# Patient Record
Sex: FEMALE | Race: WHITE | NOT HISPANIC OR LATINO | URBAN - METROPOLITAN AREA
[De-identification: names, ages, dates, MRNs, and addresses within clinical notes are randomized per-mention and may not be internally consistent; named-entity substitution may affect disease eponyms.]

---

## 2017-03-08 ENCOUNTER — OUTPATIENT (OUTPATIENT)
Dept: OUTPATIENT SERVICES | Facility: HOSPITAL | Age: 22
LOS: 1 days | Discharge: HOME | End: 2017-03-08

## 2017-06-27 DIAGNOSIS — A56.00 CHLAMYDIAL INFECTION OF LOWER GENITOURINARY TRACT, UNSPECIFIED: ICD-10-CM

## 2017-06-27 DIAGNOSIS — Z01.419 ENCOUNTER FOR GYNECOLOGICAL EXAMINATION (GENERAL) (ROUTINE) WITHOUT ABNORMAL FINDINGS: ICD-10-CM

## 2017-07-19 ENCOUNTER — OUTPATIENT (OUTPATIENT)
Dept: OUTPATIENT SERVICES | Facility: HOSPITAL | Age: 22
LOS: 1 days | Discharge: HOME | End: 2017-07-19

## 2017-07-19 DIAGNOSIS — M32.14 GLOMERULAR DISEASE IN SYSTEMIC LUPUS ERYTHEMATOSUS: ICD-10-CM

## 2018-08-29 ENCOUNTER — INPATIENT (INPATIENT)
Facility: HOSPITAL | Age: 23
LOS: 2 days | Discharge: HOME | End: 2018-09-01
Attending: INTERNAL MEDICINE | Admitting: INTERNAL MEDICINE
Payer: COMMERCIAL

## 2018-08-29 VITALS
WEIGHT: 110.01 LBS | SYSTOLIC BLOOD PRESSURE: 159 MMHG | OXYGEN SATURATION: 100 % | TEMPERATURE: 98 F | DIASTOLIC BLOOD PRESSURE: 112 MMHG | HEART RATE: 81 BPM | RESPIRATION RATE: 18 BRPM

## 2018-08-29 DIAGNOSIS — M32.9 SYSTEMIC LUPUS ERYTHEMATOSUS, UNSPECIFIED: ICD-10-CM

## 2018-08-29 DIAGNOSIS — N12 TUBULO-INTERSTITIAL NEPHRITIS, NOT SPECIFIED AS ACUTE OR CHRONIC: ICD-10-CM

## 2018-08-29 DIAGNOSIS — I10 ESSENTIAL (PRIMARY) HYPERTENSION: ICD-10-CM

## 2018-08-29 LAB
ALBUMIN SERPL ELPH-MCNC: 3.7 G/DL — SIGNIFICANT CHANGE UP (ref 3.5–5.2)
ALP SERPL-CCNC: 52 U/L — SIGNIFICANT CHANGE UP (ref 30–115)
ALT FLD-CCNC: 12 U/L — SIGNIFICANT CHANGE UP (ref 0–41)
ANION GAP SERPL CALC-SCNC: 14 MMOL/L — SIGNIFICANT CHANGE UP (ref 7–14)
APPEARANCE UR: ABNORMAL
AST SERPL-CCNC: 17 U/L — SIGNIFICANT CHANGE UP (ref 0–41)
BACTERIA # UR AUTO: ABNORMAL
BASOPHILS # BLD AUTO: 0.02 K/UL — SIGNIFICANT CHANGE UP (ref 0–0.2)
BASOPHILS NFR BLD AUTO: 0.1 % — SIGNIFICANT CHANGE UP (ref 0–1)
BILIRUB DIRECT SERPL-MCNC: <0.2 MG/DL — SIGNIFICANT CHANGE UP (ref 0–0.2)
BILIRUB INDIRECT FLD-MCNC: >0 MG/DL — LOW (ref 0.2–1.2)
BILIRUB SERPL-MCNC: 0.2 MG/DL — SIGNIFICANT CHANGE UP (ref 0.2–1.2)
BILIRUB UR-MCNC: NEGATIVE — SIGNIFICANT CHANGE UP
BUN SERPL-MCNC: 25 MG/DL — HIGH (ref 10–20)
CALCIUM SERPL-MCNC: 9.2 MG/DL — SIGNIFICANT CHANGE UP (ref 8.5–10.1)
CHLORIDE SERPL-SCNC: 101 MMOL/L — SIGNIFICANT CHANGE UP (ref 98–110)
CO2 SERPL-SCNC: 24 MMOL/L — SIGNIFICANT CHANGE UP (ref 17–32)
COLOR SPEC: YELLOW — SIGNIFICANT CHANGE UP
COMMENT - URINE: SIGNIFICANT CHANGE UP
CREAT SERPL-MCNC: 1 MG/DL — SIGNIFICANT CHANGE UP (ref 0.7–1.5)
DIFF PNL FLD: ABNORMAL
EOSINOPHIL # BLD AUTO: 0.05 K/UL — SIGNIFICANT CHANGE UP (ref 0–0.7)
EOSINOPHIL NFR BLD AUTO: 0.3 % — SIGNIFICANT CHANGE UP (ref 0–8)
EPI CELLS # UR: ABNORMAL /HPF
GLUCOSE SERPL-MCNC: 81 MG/DL — SIGNIFICANT CHANGE UP (ref 70–99)
GLUCOSE UR QL: NEGATIVE MG/DL — SIGNIFICANT CHANGE UP
HCT VFR BLD CALC: 36.1 % — LOW (ref 37–47)
HGB BLD-MCNC: 12.2 G/DL — SIGNIFICANT CHANGE UP (ref 12–16)
IMM GRANULOCYTES NFR BLD AUTO: 0.6 % — HIGH (ref 0.1–0.3)
KETONES UR-MCNC: NEGATIVE — SIGNIFICANT CHANGE UP
LACTATE SERPL-SCNC: 1.6 MMOL/L — SIGNIFICANT CHANGE UP (ref 0.5–2.2)
LEUKOCYTE ESTERASE UR-ACNC: ABNORMAL
LIDOCAIN IGE QN: 44 U/L — SIGNIFICANT CHANGE UP (ref 7–60)
LYMPHOCYTES # BLD AUTO: 13.7 % — LOW (ref 20.5–51.1)
LYMPHOCYTES # BLD AUTO: 2.21 K/UL — SIGNIFICANT CHANGE UP (ref 1.2–3.4)
MCHC RBC-ENTMCNC: 27.1 PG — SIGNIFICANT CHANGE UP (ref 27–31)
MCHC RBC-ENTMCNC: 33.8 G/DL — SIGNIFICANT CHANGE UP (ref 32–37)
MCV RBC AUTO: 80 FL — LOW (ref 81–99)
MONOCYTES # BLD AUTO: 0.41 K/UL — SIGNIFICANT CHANGE UP (ref 0.1–0.6)
MONOCYTES NFR BLD AUTO: 2.5 % — SIGNIFICANT CHANGE UP (ref 1.7–9.3)
NEUTROPHILS # BLD AUTO: 13.38 K/UL — HIGH (ref 1.4–6.5)
NEUTROPHILS NFR BLD AUTO: 82.8 % — HIGH (ref 42.2–75.2)
NITRITE UR-MCNC: NEGATIVE — SIGNIFICANT CHANGE UP
NRBC # BLD: 0 /100 WBCS — SIGNIFICANT CHANGE UP (ref 0–0)
PH UR: 6 — SIGNIFICANT CHANGE UP (ref 5–8)
PLATELET # BLD AUTO: 334 K/UL — SIGNIFICANT CHANGE UP (ref 130–400)
POTASSIUM SERPL-MCNC: 4.2 MMOL/L — SIGNIFICANT CHANGE UP (ref 3.5–5)
POTASSIUM SERPL-SCNC: 4.2 MMOL/L — SIGNIFICANT CHANGE UP (ref 3.5–5)
PROT SERPL-MCNC: 5.8 G/DL — LOW (ref 6–8)
PROT UR-MCNC: 100 MG/DL
RBC # BLD: 4.51 M/UL — SIGNIFICANT CHANGE UP (ref 4.2–5.4)
RBC # FLD: 12.2 % — SIGNIFICANT CHANGE UP (ref 11.5–14.5)
RBC CASTS # UR COMP ASSIST: ABNORMAL /HPF
SODIUM SERPL-SCNC: 139 MMOL/L — SIGNIFICANT CHANGE UP (ref 135–146)
SP GR SPEC: >=1.03 (ref 1.01–1.03)
UROBILINOGEN FLD QL: 0.2 MG/DL — SIGNIFICANT CHANGE UP (ref 0.2–0.2)
WBC # BLD: 16.17 K/UL — HIGH (ref 4.8–10.8)
WBC # FLD AUTO: 16.17 K/UL — HIGH (ref 4.8–10.8)
WBC UR QL: >50 /HPF

## 2018-08-29 PROCEDURE — 93970 EXTREMITY STUDY: CPT | Mod: 26

## 2018-08-29 RX ORDER — LOSARTAN POTASSIUM 100 MG/1
50 TABLET, FILM COATED ORAL DAILY
Qty: 0 | Refills: 0 | Status: DISCONTINUED | OUTPATIENT
Start: 2018-08-29 | End: 2018-09-01

## 2018-08-29 RX ORDER — SODIUM CHLORIDE 9 MG/ML
1000 INJECTION INTRAMUSCULAR; INTRAVENOUS; SUBCUTANEOUS ONCE
Qty: 0 | Refills: 0 | Status: COMPLETED | OUTPATIENT
Start: 2018-08-29 | End: 2018-08-29

## 2018-08-29 RX ORDER — MEROPENEM 1 G/30ML
500 INJECTION INTRAVENOUS EVERY 8 HOURS
Qty: 0 | Refills: 0 | Status: DISCONTINUED | OUTPATIENT
Start: 2018-08-29 | End: 2018-08-30

## 2018-08-29 RX ORDER — HYDROXYCHLOROQUINE SULFATE 200 MG
200 TABLET ORAL
Qty: 0 | Refills: 0 | Status: DISCONTINUED | OUTPATIENT
Start: 2018-08-29 | End: 2018-09-01

## 2018-08-29 RX ORDER — MORPHINE SULFATE 50 MG/1
6 CAPSULE, EXTENDED RELEASE ORAL ONCE
Qty: 0 | Refills: 0 | Status: DISCONTINUED | OUTPATIENT
Start: 2018-08-29 | End: 2018-08-29

## 2018-08-29 RX ORDER — HEPARIN SODIUM 5000 [USP'U]/ML
5000 INJECTION INTRAVENOUS; SUBCUTANEOUS EVERY 12 HOURS
Qty: 0 | Refills: 0 | Status: DISCONTINUED | OUTPATIENT
Start: 2018-08-29 | End: 2018-09-01

## 2018-08-29 RX ORDER — ACETAMINOPHEN 500 MG
650 TABLET ORAL EVERY 6 HOURS
Qty: 0 | Refills: 0 | Status: DISCONTINUED | OUTPATIENT
Start: 2018-08-29 | End: 2018-09-01

## 2018-08-29 RX ORDER — CEFTRIAXONE 500 MG/1
1 INJECTION, POWDER, FOR SOLUTION INTRAMUSCULAR; INTRAVENOUS ONCE
Qty: 0 | Refills: 0 | Status: COMPLETED | OUTPATIENT
Start: 2018-08-29 | End: 2018-08-29

## 2018-08-29 RX ORDER — TACROLIMUS 5 MG/1
1500 CAPSULE ORAL
Qty: 0 | Refills: 0 | COMMUNITY

## 2018-08-29 RX ORDER — KETOROLAC TROMETHAMINE 30 MG/ML
15 SYRINGE (ML) INJECTION EVERY 6 HOURS
Qty: 0 | Refills: 0 | Status: DISCONTINUED | OUTPATIENT
Start: 2018-08-29 | End: 2018-09-01

## 2018-08-29 RX ORDER — MYCOPHENOLATE MOFETIL 250 MG/1
1500 CAPSULE ORAL DAILY
Qty: 0 | Refills: 0 | Status: DISCONTINUED | OUTPATIENT
Start: 2018-08-29 | End: 2018-09-01

## 2018-08-29 RX ORDER — ASPIRIN/CALCIUM CARB/MAGNESIUM 324 MG
81 TABLET ORAL DAILY
Qty: 0 | Refills: 0 | Status: DISCONTINUED | OUTPATIENT
Start: 2018-08-29 | End: 2018-09-01

## 2018-08-29 RX ORDER — MEROPENEM 1 G/30ML
INJECTION INTRAVENOUS
Qty: 0 | Refills: 0 | Status: DISCONTINUED | OUTPATIENT
Start: 2018-08-29 | End: 2018-08-30

## 2018-08-29 RX ORDER — LABETALOL HCL 100 MG
400 TABLET ORAL
Qty: 0 | Refills: 0 | Status: DISCONTINUED | OUTPATIENT
Start: 2018-08-29 | End: 2018-09-01

## 2018-08-29 RX ORDER — LOSARTAN POTASSIUM 100 MG/1
0 TABLET, FILM COATED ORAL
Qty: 0 | Refills: 0 | COMMUNITY

## 2018-08-29 RX ORDER — TACROLIMUS 5 MG/1
0 CAPSULE ORAL
Qty: 0 | Refills: 0 | COMMUNITY

## 2018-08-29 RX ORDER — TACROLIMUS 5 MG/1
2 CAPSULE ORAL
Qty: 0 | Refills: 0 | Status: DISCONTINUED | OUTPATIENT
Start: 2018-08-29 | End: 2018-09-01

## 2018-08-29 RX ORDER — SODIUM CHLORIDE 9 MG/ML
1000 INJECTION INTRAMUSCULAR; INTRAVENOUS; SUBCUTANEOUS
Qty: 0 | Refills: 0 | Status: DISCONTINUED | OUTPATIENT
Start: 2018-08-29 | End: 2018-08-29

## 2018-08-29 RX ORDER — CEFTRIAXONE 500 MG/1
1 INJECTION, POWDER, FOR SOLUTION INTRAMUSCULAR; INTRAVENOUS ONCE
Qty: 0 | Refills: 0 | Status: DISCONTINUED | OUTPATIENT
Start: 2018-08-29 | End: 2018-08-29

## 2018-08-29 RX ORDER — SODIUM CHLORIDE 9 MG/ML
3 INJECTION INTRAMUSCULAR; INTRAVENOUS; SUBCUTANEOUS ONCE
Qty: 0 | Refills: 0 | Status: COMPLETED | OUTPATIENT
Start: 2018-08-29 | End: 2018-08-29

## 2018-08-29 RX ORDER — ASPIRIN/CALCIUM CARB/MAGNESIUM 324 MG
0 TABLET ORAL
Qty: 0 | Refills: 0 | COMMUNITY

## 2018-08-29 RX ORDER — SODIUM CHLORIDE 9 MG/ML
1000 INJECTION INTRAMUSCULAR; INTRAVENOUS; SUBCUTANEOUS
Qty: 0 | Refills: 0 | Status: DISCONTINUED | OUTPATIENT
Start: 2018-08-29 | End: 2018-08-31

## 2018-08-29 RX ORDER — HYDROXYCHLOROQUINE SULFATE 200 MG
0 TABLET ORAL
Qty: 0 | Refills: 0 | COMMUNITY

## 2018-08-29 RX ORDER — MEROPENEM 1 G/30ML
500 INJECTION INTRAVENOUS ONCE
Qty: 0 | Refills: 0 | Status: COMPLETED | OUTPATIENT
Start: 2018-08-29 | End: 2018-08-29

## 2018-08-29 RX ADMIN — SODIUM CHLORIDE 1000 MILLILITER(S): 9 INJECTION INTRAMUSCULAR; INTRAVENOUS; SUBCUTANEOUS at 05:06

## 2018-08-29 RX ADMIN — MORPHINE SULFATE 6 MILLIGRAM(S): 50 CAPSULE, EXTENDED RELEASE ORAL at 06:24

## 2018-08-29 RX ADMIN — Medication 15 MILLIGRAM(S): at 17:12

## 2018-08-29 RX ADMIN — Medication 200 MILLIGRAM(S): at 17:12

## 2018-08-29 RX ADMIN — SODIUM CHLORIDE 125 MILLILITER(S): 9 INJECTION INTRAMUSCULAR; INTRAVENOUS; SUBCUTANEOUS at 12:22

## 2018-08-29 RX ADMIN — MORPHINE SULFATE 6 MILLIGRAM(S): 50 CAPSULE, EXTENDED RELEASE ORAL at 06:37

## 2018-08-29 RX ADMIN — CEFTRIAXONE 100 GRAM(S): 500 INJECTION, POWDER, FOR SOLUTION INTRAMUSCULAR; INTRAVENOUS at 05:05

## 2018-08-29 RX ADMIN — SODIUM CHLORIDE 125 MILLILITER(S): 9 INJECTION INTRAMUSCULAR; INTRAVENOUS; SUBCUTANEOUS at 12:45

## 2018-08-29 RX ADMIN — TACROLIMUS 2 MILLIGRAM(S): 5 CAPSULE ORAL at 17:13

## 2018-08-29 RX ADMIN — MORPHINE SULFATE 6 MILLIGRAM(S): 50 CAPSULE, EXTENDED RELEASE ORAL at 08:04

## 2018-08-29 RX ADMIN — Medication 650 MILLIGRAM(S): at 09:43

## 2018-08-29 RX ADMIN — SODIUM CHLORIDE 75 MILLILITER(S): 9 INJECTION INTRAMUSCULAR; INTRAVENOUS; SUBCUTANEOUS at 09:44

## 2018-08-29 RX ADMIN — SODIUM CHLORIDE 3 MILLILITER(S): 9 INJECTION INTRAMUSCULAR; INTRAVENOUS; SUBCUTANEOUS at 05:05

## 2018-08-29 RX ADMIN — Medication 20 MILLIGRAM(S): at 11:49

## 2018-08-29 RX ADMIN — MYCOPHENOLATE MOFETIL 1500 MILLIGRAM(S): 250 CAPSULE ORAL at 11:48

## 2018-08-29 RX ADMIN — Medication 81 MILLIGRAM(S): at 11:48

## 2018-08-29 RX ADMIN — MEROPENEM 100 MILLIGRAM(S): 1 INJECTION INTRAVENOUS at 10:25

## 2018-08-29 RX ADMIN — LOSARTAN POTASSIUM 50 MILLIGRAM(S): 100 TABLET, FILM COATED ORAL at 11:48

## 2018-08-29 RX ADMIN — SODIUM CHLORIDE 125 MILLILITER(S): 9 INJECTION INTRAMUSCULAR; INTRAVENOUS; SUBCUTANEOUS at 06:40

## 2018-08-29 RX ADMIN — Medication 400 MILLIGRAM(S): at 17:12

## 2018-08-29 RX ADMIN — MEROPENEM 100 MILLIGRAM(S): 1 INJECTION INTRAVENOUS at 21:29

## 2018-08-29 RX ADMIN — MEROPENEM 100 MILLIGRAM(S): 1 INJECTION INTRAVENOUS at 14:06

## 2018-08-29 NOTE — H&P ADULT - PROBLEM SELECTOR PLAN 1
for now continue iv rocephin started in the ER with ID consult for now continue iv rocephin started in the ER with ID consult. Based on CAT scan some concern there may be a gall bladder component

## 2018-08-29 NOTE — ED PROVIDER NOTE - OBJECTIVE STATEMENT
23 year old female past medical history of Lupus with kidney involvement on celceft, playuinal. patient states since last night having right flank pains with nausea and states urine appears cloudy. patient denies fever/chills, no chest pain, no shortness of breath, no dysuria. 23 year old female past medical history of Lupus with kidney involvement on cellcept , tacromilus, plaqyuinal. patient states since last night having right flank pains with nausea and states urine appears cloudy. patient denies fever/chills, no chest pain, no shortness of breath, no dysuria.

## 2018-08-29 NOTE — CHART NOTE - NSCHARTNOTEFT_GEN_A_CORE
Pt was seen and examined at bedside, she feels better , still c/o right flank pain at times, spiked fever this morning. Pt received Tylenol, blood culture was sent at 11 AM, Abx changed to Meropenem ( pt is immunocompromised, she was diagnosed with Lupus at age 12), ID consulted.    I called pts rheumatology Dr Darnell Burns [phone], message left.  Case d/w rheumatology Dr Jean ( from service)  [phone], will c/w current medications for lupus check C3, C4, DS DNA and LUIS ALFREDO level , will cancel official consult.

## 2018-08-29 NOTE — H&P ADULT - HISTORY OF PRESENT ILLNESS
24yo female with history of lupus presents to the ER due to right lower quadrant pain since 0100 today.ates up to a 7/10 on pain scale. She denies fever or dysuria. Feels better after ER treatment

## 2018-08-29 NOTE — ED PROVIDER NOTE - MEDICAL DECISION MAKING DETAILS
23yF pmhx sle, on immuno suppressant sp./w  dyuria  flank pain  - fever chilsl no hematuria   no adbomainl pain  PE  AVSS, exam as noted, CTAB, RRR, abdomen soft NTND, (+) bowel sounds no cva tendneress skin no rash -  ct  for abscess infected  stone,  labs urine  ,

## 2018-08-29 NOTE — H&P ADULT - NSHPLABSRESULTS_GEN_ALL_CORE
< from: CT Abdomen and Pelvis w/ IV Cont (08.29.18 @ 06:11) >    EXAM:  CT ABDOMEN AND PELVIS IC          PROCEDURE DATE:  08/29/2018      IMPRESSION:   1. No evidence of acute intra-abdominalpathology.  2. 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           BRENDA RUCKER M.D., ATTENDING RADIOLOGIST  This document has been electronically signed. Aug 29 2018  6:09AM      < end of copied text >                          12.2   16.17 )-----------( 334      ( 29 Aug 2018 05:20 )             36.1     08-29    139  |  101  |  25<H>  ----------------------------<  81  4.2   |  24  |  1.0    Ca    9.2      29 Aug 2018 05:20    TPro  5.8<L>  /  Alb  3.7  /  TBili  0.2  /  DBili  <0.2  /  AST  17  /  ALT  12  /  AlkPhos  52  08-29          Urinalysis Basic - ( 29 Aug 2018 04:50 )    Color: Yellow / Appearance: Cloudy / SG: >=1.030 / pH: x  Gluc: x / Ketone: Negative  / Bili: Negative / Urobili: 0.2 mg/dL   Blood: x / Protein: 100 mg/dL / Nitrite: Negative   Leuk Esterase: Moderate / RBC: 2-5 /HPF / WBC >50 /HPF   Sq Epi: x / Non Sq Epi: Few /HPF / Bacteria: Moderate        Lactate Trend  08-29 @ 05:20 Lactate:1.6         CAPILLARY BLOOD GLUCOSE

## 2018-08-29 NOTE — ED PROVIDER NOTE - NS ED ROS FT
Constitutional: (-) fever  Eyes/ENT: (-) blurry vision, (-) epistaxis  Cardiovascular: (-) chest pain, (-) syncope  Respiratory: (-) cough, (-) shortness of breath  Gastrointestinal: (-) vomiting, (-) diarrhea  Musculoskeletal: (-) neck pain, (+) back pain, (-) joint pain  Integumentary: (-) rash, (-) edema  Neurological: (-) headache, (-) altered mental status

## 2018-08-29 NOTE — ED PROVIDER NOTE - PHYSICAL EXAMINATION
Physical Exam    Vital Signs: I have reviewed the initial vital signs.  Constitutional: well-nourished, appears stated age, no acute distress  Eyes: Conjunctiva pink, Sclera clear, PERRLA, EOMI.  Cardiovascular: S1 and S2, regular rate, regular rhythm, well-perfused extremities, radial pulses equal and 2+  Respiratory: unlabored respiratory effort, clear to auscultation bilaterally no wheezing, rales and rhonchi  Gastrointestinal: soft, non-tender abdomen, no pulsatile mass, normal bowl sounds  Musculoskeletal: supple neck, no lower extremity edema, no midline tenderness  Integumentary: warm, dry, no rash  Neurologic: awake, alert, cranial nerves II-XII grossly intact, extremities’ motor and sensory functions grossly intact  Psychiatric: appropriate mood, appropriate affect Physical Exam    Vital Signs: I have reviewed the initial vital signs.  Constitutional: well-nourished, appears stated age, no acute distress  Eyes: Conjunctiva pink, Sclera clear, PERRLA, EOMI.  Cardiovascular: S1 and S2, regular rate, regular rhythm, well-perfused extremities, radial pulses equal and 2+  Respiratory: unlabored respiratory effort, clear to auscultation bilaterally no wheezing, rales and rhonchi  Gastrointestinal: soft, non-tender abdomen, no pulsatile mass, normal bowl sounds + Right CVA tenderness  Musculoskeletal: supple neck, no lower extremity edema, no midline tenderness  Integumentary: warm, dry, no rash  Neurologic: awake, alert, cranial nerves II-XII grossly intact, extremities’ motor and sensory functions grossly intact  Psychiatric: appropriate mood, appropriate affect

## 2018-08-30 DIAGNOSIS — A41.9 SEPSIS, UNSPECIFIED ORGANISM: ICD-10-CM

## 2018-08-30 LAB
ALBUMIN SERPL ELPH-MCNC: 2.3 G/DL — LOW (ref 3.5–5.2)
ALP SERPL-CCNC: 48 U/L — SIGNIFICANT CHANGE UP (ref 30–115)
ALT FLD-CCNC: 8 U/L — SIGNIFICANT CHANGE UP (ref 0–41)
ANION GAP SERPL CALC-SCNC: 9 MMOL/L — SIGNIFICANT CHANGE UP (ref 7–14)
AST SERPL-CCNC: 12 U/L — SIGNIFICANT CHANGE UP (ref 0–41)
BILIRUB SERPL-MCNC: 0.2 MG/DL — SIGNIFICANT CHANGE UP (ref 0.2–1.2)
BUN SERPL-MCNC: 13 MG/DL — SIGNIFICANT CHANGE UP (ref 10–20)
C3 SERPL-MCNC: 104 MG/DL — SIGNIFICANT CHANGE UP (ref 81–157)
C4 SERPL-MCNC: 19 MG/DL — SIGNIFICANT CHANGE UP (ref 13–39)
CALCIUM SERPL-MCNC: 7.6 MG/DL — LOW (ref 8.5–10.1)
CHLORIDE SERPL-SCNC: 110 MMOL/L — SIGNIFICANT CHANGE UP (ref 98–110)
CO2 SERPL-SCNC: 20 MMOL/L — SIGNIFICANT CHANGE UP (ref 17–32)
CREAT SERPL-MCNC: 0.7 MG/DL — SIGNIFICANT CHANGE UP (ref 0.7–1.5)
DSDNA AB SER-ACNC: 99 IU/ML — HIGH
GLUCOSE SERPL-MCNC: 82 MG/DL — SIGNIFICANT CHANGE UP (ref 70–99)
HCT VFR BLD CALC: 30.1 % — LOW (ref 37–47)
HGB BLD-MCNC: 10 G/DL — LOW (ref 12–16)
MAGNESIUM SERPL-MCNC: 1.6 MG/DL — LOW (ref 1.8–2.4)
MCHC RBC-ENTMCNC: 27 PG — SIGNIFICANT CHANGE UP (ref 27–31)
MCHC RBC-ENTMCNC: 33.2 G/DL — SIGNIFICANT CHANGE UP (ref 32–37)
MCV RBC AUTO: 81.4 FL — SIGNIFICANT CHANGE UP (ref 81–99)
NRBC # BLD: 0 /100 WBCS — SIGNIFICANT CHANGE UP (ref 0–0)
PLATELET # BLD AUTO: 256 K/UL — SIGNIFICANT CHANGE UP (ref 130–400)
POTASSIUM SERPL-MCNC: 4 MMOL/L — SIGNIFICANT CHANGE UP (ref 3.5–5)
POTASSIUM SERPL-SCNC: 4 MMOL/L — SIGNIFICANT CHANGE UP (ref 3.5–5)
PROT SERPL-MCNC: 3.9 G/DL — LOW (ref 6–8)
RBC # BLD: 3.7 M/UL — LOW (ref 4.2–5.4)
RBC # FLD: 12.3 % — SIGNIFICANT CHANGE UP (ref 11.5–14.5)
SODIUM SERPL-SCNC: 139 MMOL/L — SIGNIFICANT CHANGE UP (ref 135–146)
WBC # BLD: 14.04 K/UL — HIGH (ref 4.8–10.8)
WBC # FLD AUTO: 14.04 K/UL — HIGH (ref 4.8–10.8)

## 2018-08-30 RX ORDER — CEFTRIAXONE 500 MG/1
1 INJECTION, POWDER, FOR SOLUTION INTRAMUSCULAR; INTRAVENOUS EVERY 24 HOURS
Qty: 0 | Refills: 0 | Status: DISCONTINUED | OUTPATIENT
Start: 2018-08-30 | End: 2018-09-01

## 2018-08-30 RX ADMIN — TACROLIMUS 2 MILLIGRAM(S): 5 CAPSULE ORAL at 17:09

## 2018-08-30 RX ADMIN — Medication 200 MILLIGRAM(S): at 17:09

## 2018-08-30 RX ADMIN — Medication 200 MILLIGRAM(S): at 05:49

## 2018-08-30 RX ADMIN — SODIUM CHLORIDE 125 MILLILITER(S): 9 INJECTION INTRAMUSCULAR; INTRAVENOUS; SUBCUTANEOUS at 01:10

## 2018-08-30 RX ADMIN — TACROLIMUS 2 MILLIGRAM(S): 5 CAPSULE ORAL at 05:49

## 2018-08-30 RX ADMIN — Medication 81 MILLIGRAM(S): at 11:08

## 2018-08-30 RX ADMIN — Medication 400 MILLIGRAM(S): at 17:10

## 2018-08-30 RX ADMIN — MYCOPHENOLATE MOFETIL 1500 MILLIGRAM(S): 250 CAPSULE ORAL at 11:09

## 2018-08-30 RX ADMIN — CEFTRIAXONE 100 GRAM(S): 500 INJECTION, POWDER, FOR SOLUTION INTRAMUSCULAR; INTRAVENOUS at 09:31

## 2018-08-30 RX ADMIN — Medication 400 MILLIGRAM(S): at 05:50

## 2018-08-30 RX ADMIN — MEROPENEM 100 MILLIGRAM(S): 1 INJECTION INTRAVENOUS at 05:49

## 2018-08-30 RX ADMIN — SODIUM CHLORIDE 125 MILLILITER(S): 9 INJECTION INTRAMUSCULAR; INTRAVENOUS; SUBCUTANEOUS at 21:11

## 2018-08-30 RX ADMIN — Medication 20 MILLIGRAM(S): at 05:49

## 2018-08-30 RX ADMIN — LOSARTAN POTASSIUM 50 MILLIGRAM(S): 100 TABLET, FILM COATED ORAL at 05:49

## 2018-08-31 DIAGNOSIS — M32.9 SYSTEMIC LUPUS ERYTHEMATOSUS, UNSPECIFIED: ICD-10-CM

## 2018-08-31 DIAGNOSIS — A41.51 SEPSIS DUE TO ESCHERICHIA COLI [E. COLI]: ICD-10-CM

## 2018-08-31 LAB
-  AMIKACIN: SIGNIFICANT CHANGE UP
-  AMOXICILLIN/CLAVULANIC ACID: SIGNIFICANT CHANGE UP
-  AMPICILLIN/SULBACTAM: SIGNIFICANT CHANGE UP
-  AMPICILLIN: SIGNIFICANT CHANGE UP
-  AZTREONAM: SIGNIFICANT CHANGE UP
-  CEFAZOLIN: SIGNIFICANT CHANGE UP
-  CEFEPIME: SIGNIFICANT CHANGE UP
-  CEFOXITIN: SIGNIFICANT CHANGE UP
-  CEFTRIAXONE: SIGNIFICANT CHANGE UP
-  CIPROFLOXACIN: SIGNIFICANT CHANGE UP
-  ERTAPENEM: SIGNIFICANT CHANGE UP
-  GENTAMICIN: SIGNIFICANT CHANGE UP
-  IMIPENEM: SIGNIFICANT CHANGE UP
-  LEVOFLOXACIN: SIGNIFICANT CHANGE UP
-  MEROPENEM: SIGNIFICANT CHANGE UP
-  NITROFURANTOIN: SIGNIFICANT CHANGE UP
-  PIPERACILLIN/TAZOBACTAM: SIGNIFICANT CHANGE UP
-  TIGECYCLINE: SIGNIFICANT CHANGE UP
-  TOBRAMYCIN: SIGNIFICANT CHANGE UP
-  TRIMETHOPRIM/SULFAMETHOXAZOLE: SIGNIFICANT CHANGE UP
ANA PAT FLD IF-IMP: ABNORMAL
ANA TITR SER: ABNORMAL
CULTURE RESULTS: SIGNIFICANT CHANGE UP
HCT VFR BLD CALC: 29.1 % — LOW (ref 37–47)
HGB BLD-MCNC: 9.7 G/DL — LOW (ref 12–16)
MCHC RBC-ENTMCNC: 27.2 PG — SIGNIFICANT CHANGE UP (ref 27–31)
MCHC RBC-ENTMCNC: 33.3 G/DL — SIGNIFICANT CHANGE UP (ref 32–37)
MCV RBC AUTO: 81.5 FL — SIGNIFICANT CHANGE UP (ref 81–99)
METHOD TYPE: SIGNIFICANT CHANGE UP
NRBC # BLD: 0 /100 WBCS — SIGNIFICANT CHANGE UP (ref 0–0)
ORGANISM # SPEC MICROSCOPIC CNT: SIGNIFICANT CHANGE UP
ORGANISM # SPEC MICROSCOPIC CNT: SIGNIFICANT CHANGE UP
PLATELET # BLD AUTO: 279 K/UL — SIGNIFICANT CHANGE UP (ref 130–400)
RBC # BLD: 3.57 M/UL — LOW (ref 4.2–5.4)
RBC # FLD: 12.2 % — SIGNIFICANT CHANGE UP (ref 11.5–14.5)
SPECIMEN SOURCE: SIGNIFICANT CHANGE UP
WBC # BLD: 12.65 K/UL — HIGH (ref 4.8–10.8)
WBC # FLD AUTO: 12.65 K/UL — HIGH (ref 4.8–10.8)

## 2018-08-31 RX ORDER — FUROSEMIDE 40 MG
40 TABLET ORAL ONCE
Qty: 0 | Refills: 0 | Status: COMPLETED | OUTPATIENT
Start: 2018-08-31 | End: 2018-08-31

## 2018-08-31 RX ORDER — AMLODIPINE BESYLATE 2.5 MG/1
5 TABLET ORAL ONCE
Qty: 0 | Refills: 0 | Status: COMPLETED | OUTPATIENT
Start: 2018-08-31 | End: 2018-08-31

## 2018-08-31 RX ADMIN — Medication 400 MILLIGRAM(S): at 06:12

## 2018-08-31 RX ADMIN — AMLODIPINE BESYLATE 5 MILLIGRAM(S): 2.5 TABLET ORAL at 10:25

## 2018-08-31 RX ADMIN — TACROLIMUS 2 MILLIGRAM(S): 5 CAPSULE ORAL at 06:12

## 2018-08-31 RX ADMIN — LOSARTAN POTASSIUM 50 MILLIGRAM(S): 100 TABLET, FILM COATED ORAL at 06:12

## 2018-08-31 RX ADMIN — Medication 200 MILLIGRAM(S): at 17:30

## 2018-08-31 RX ADMIN — TACROLIMUS 2 MILLIGRAM(S): 5 CAPSULE ORAL at 17:30

## 2018-08-31 RX ADMIN — MYCOPHENOLATE MOFETIL 1500 MILLIGRAM(S): 250 CAPSULE ORAL at 11:11

## 2018-08-31 RX ADMIN — CEFTRIAXONE 100 GRAM(S): 500 INJECTION, POWDER, FOR SOLUTION INTRAMUSCULAR; INTRAVENOUS at 10:25

## 2018-08-31 RX ADMIN — Medication 400 MILLIGRAM(S): at 17:30

## 2018-08-31 RX ADMIN — Medication 650 MILLIGRAM(S): at 19:45

## 2018-08-31 RX ADMIN — Medication 81 MILLIGRAM(S): at 11:10

## 2018-08-31 RX ADMIN — Medication 200 MILLIGRAM(S): at 06:12

## 2018-08-31 RX ADMIN — Medication 40 MILLIGRAM(S): at 13:27

## 2018-08-31 RX ADMIN — Medication 20 MILLIGRAM(S): at 06:12

## 2018-08-31 NOTE — PROGRESS NOTE ADULT - PROBLEM SELECTOR PLAN 1
follow cx  DC planning   PO abx  at least 10 days of abx - IV to Po
on Ceftriaxone - follow cx for d/c  at least 10 days of abx - IV to PO

## 2018-08-31 NOTE — PROGRESS NOTE ADULT - SUBJECTIVE AND OBJECTIVE BOX
ИВАН HSIEH  23y  Female      Patient is a 23y old female who presents with a chief complaint of right lower quadrant pain found to have pyelonephritis (29 Aug 2018 06:48)      INTERVAL HPI/OVERNIGHT EVENTS:  Complained of calf pain - doppler negative for dvt.  BP elevated today    REVIEW OF SYSTEMS:  CONSTITUTIONAL: No fever, weight loss, or fatigue  EYES: No eye pain, visual disturbances, or discharge  ENMT:  c/o of facial swelling  NECK: No pain or stiffness  RESPIRATORY: No cough, wheezing, chills or hemoptysis; No shortness of breath  CARDIOVASCULAR: No chest pain, palpitations, dizziness, or leg swelling  GASTROINTESTINAL: No abdominal or epigastric pain. No nausea, vomiting, or hematemesis; No diarrhea or constipation. No melena or hematochezia.  GENITOURINARY: No dysuria, frequency, hematuria, or incontinence  NEUROLOGICAL: No headaches, memory loss, loss of strength, numbness, or tremors  SKIN: No itching, burning, rashes, or lesions   LYMPH NODES: No enlarged glands  ENDOCRINE: No heat or cold intolerance; No hair loss  MUSCULOSKELETAL: right calf discomfort  PSYCHIATRIC: No depression, anxiety, mood swings, or difficulty sleeping  HEME/LYMPH: No easy bruising, or bleeding gums  ALLERY AND IMMUNOLOGIC: No hives or eczema    T(C): 36.7 (08-31-18 @ 06:09), Max: 36.7 (08-31-18 @ 06:09)  HR: 70 (08-31-18 @ 06:09) (70 - 89)  BP: 172/95 (08-31-18 @ 06:09) (133/91 - 172/95)  RR: 16 (08-30-18 @ 22:06) (16 - 16)  SpO2: --      PHYSICAL EXAM:  GENERAL: NAD, well-groomed, well-developed  HEAD:  Atraumatic, Normocephalic  EYES: EOMI, PERRLA, conjunctiva and sclera clear  ENMT: No tonsillar erythema, exudates, or enlargement; Moist mucous membranes, Good dentition, No lesions  NECK: Supple, No JVD, Normal thyroid  NERVOUS SYSTEM:  Alert & Oriented X3, Good concentration; Motor Strength 5/5 B/L upper and lower extremities; DTRs 2+ intact and symmetric  CHEST/LUNG: Clear to percussion bilaterally; No rales, rhonchi, wheezing, or rubs  HEART: Regular rate and rhythm; No murmurs, rubs, or gallops  ABDOMEN: Soft, Nontender, Nondistended; Bowel sounds present  EXTREMITIES:  2+ Peripheral Pulses, No clubbing, cyanosis, or edema  LYMPH: No lymphadenopathy noted  SKIN: No rashes or lesions    Consultant(s) Notes Reviewed:  [x ] YES  [ ] NO  Care Discussed with Consultants/Other Providers [ x] YES  [ ] NO    LAB:  08-30    139  |  110  |  13  ----------------------------<  82  4.0   |  20  |  0.7    Ca    7.6<L>      30 Aug 2018 07:31  Mg     1.6     08-30    TPro  3.9<L>  /  Alb  2.3<L>  /  TBili  0.2  /  DBili  x   /  AST  12  /  ALT  8   /  AlkPhos  48  08-30                              9.7    12.65 )-----------( 279      ( 31 Aug 2018 06:11 )             29.1     Daily     Daily   Drug Dosing Weight  Height (cm): 149.86 (29 Aug 2018 08:20)  Weight (kg): 48 (29 Aug 2018 08:20)  BMI (kg/m2): 21.4 (29 Aug 2018 08:20)  BSA (m2): 1.41 (29 Aug 2018 08:20)  CAPILLARY BLOOD GLUCOSE        I&O's Summary    30 Aug 2018 07:01  -  31 Aug 2018 07:00  --------------------------------------------------------  IN: 1500 mL / OUT: 0 mL / NET: 1500 mL        LIVER FUNCTIONS - ( 30 Aug 2018 07:31 )  Alb: 2.3 g/dL / Pro: 3.9 g/dL / ALK PHOS: 48 U/L / ALT: 8 U/L / AST: 12 U/L / GGT: x               RADIOLOGY & ADDITIONAL TESTS:    Imaging Personally Reviewed:  [ ] YES  [ ] NO    MEDICATIONS  (STANDING):  amLODIPine   Tablet 5 milliGRAM(s) Oral once  aspirin  chewable 81 milliGRAM(s) Oral daily  cefTRIAXone   IVPB 1 Gram(s) IV Intermittent every 24 hours  heparin  Injectable 5000 Unit(s) SubCutaneous every 12 hours  hydroxychloroquine 200 milliGRAM(s) Oral two times a day  labetalol 400 milliGRAM(s) Oral two times a day  losartan 50 milliGRAM(s) Oral daily  mycophenolate mofetil 1500 milliGRAM(s) Oral daily  predniSONE   Tablet 20 milliGRAM(s) Oral daily  tacrolimus 2 milliGRAM(s) Oral two times a day    MEDICATIONS  (PRN):  acetaminophen   Tablet 650 milliGRAM(s) Oral every 6 hours PRN For Temp greater than 38 C (100.4 F)  ketorolac   Injectable 15 milliGRAM(s) IV Push every 6 hours PRN Moderate Pain (4 - 6)      HEALTH ISSUES - PROBLEM Dx:  Sepsis due to Escherichia coli: Sepsis due to Escherichia coli  Sepsis, due to unspecified organism: Sepsis, due to unspecified organism  Hypertension, unspecified type: Hypertension, unspecified type  Systemic lupus erythematosus, unspecified SLE type, unspecified organ involvement status: Systemic lupus erythematosus, unspecified SLE type, unspecified organ involvement status  Pyelonephritis: Pyelonephritis

## 2018-08-31 NOTE — PROGRESS NOTE ADULT - ASSESSMENT
22yo female with history of lupus presents to the ER due to right lower quadrant pain rates it up to a 7/10 on pain scale. She denies fever or dysuria. Feels better today but complains of facial swelling related to her BP being elevated today.    Anticipate d/c in am when cultures are resulted

## 2018-08-31 NOTE — PROGRESS NOTE ADULT - SUBJECTIVE AND OBJECTIVE BOX
infectious diseases progress note:  ИВАН HSIEH is a 23yFemale patient    PYELONEPHRITIS    Sepsis, due to unspecified organism  Hypertension, unspecified type  Systemic lupus erythematosus, unspecified SLE type, unspecified organ involvement status  Pyelonephritis      ROS:  not contributory     Allergies    No Known Allergies    ANTIBIOTICS/RELEVANT:  antimicrobials  cefTRIAXone   IVPB 1 Gram(s) IV Intermittent every 24 hours  hydroxychloroquine 200 milliGRAM(s) Oral two times a day    immunologic:  mycophenolate mofetil 1500 milliGRAM(s) Oral daily  tacrolimus 2 milliGRAM(s) Oral two times a day    OTHER:  acetaminophen   Tablet 650 milliGRAM(s) Oral every 6 hours PRN  aspirin  chewable 81 milliGRAM(s) Oral daily  heparin  Injectable 5000 Unit(s) SubCutaneous every 12 hours  ketorolac   Injectable 15 milliGRAM(s) IV Push every 6 hours PRN  labetalol 400 milliGRAM(s) Oral two times a day  losartan 50 milliGRAM(s) Oral daily  predniSONE   Tablet 20 milliGRAM(s) Oral daily  sodium chloride 0.9%. 1000 milliLiter(s) IV Continuous <Continuous>      Objective:  T(F): 98.1 (08-31-18 @ 06:09), Max: 98.1 (08-31-18 @ 06:09)  HR: 70 (08-31-18 @ 06:09) (70 - 89)  BP: 172/95 (08-31-18 @ 06:09) (133/91 - 172/95)  RR: 16 (08-30-18 @ 22:06) (16 - 16)  SpO2: --    PHYSICAL EXAM:  Constitutional:Well-developed, well nourished  Eyes:LEANN, EOMI  Ear/Nose/Throat: no oral lesion, no sinus tenderness on percussion	  Neck:no JVD, no lymphadenopathy, supple  Respiratory: CTA ashley  Cardiovascular: S1S2 RRR, no murmurs  Gastrointestinal:soft, (+) BS, no HSM  Extremities:no phlebitis     LABS:                        9.7    12.65 )-----------( 279      ( 31 Aug 2018 06:11 )             29.1     08-30    139  |  110  |  13  ----------------------------<  82  4.0   |  20  |  0.7    Ca    7.6<L>      30 Aug 2018 07:31  Mg     1.6     08-30    TPro  3.9<L>  /  Alb  2.3<L>  /  TBili  0.2  /  DBili  x   /  AST  12  /  ALT  8   /  AlkPhos  48  08-30  MICROBIOLOGY:    Culture - Blood (collected 08-29-18 @ 12:11)  Source: .Blood None  Preliminary Report (08-31-18 @ 01:02):    No growth to date.    Culture - Urine (collected 08-29-18 @ 04:50)  Source: .Urine Clean Catch (Midstream)  Preliminary Report (08-30-18 @ 17:15):    >100,000 CFU/ml Escherichia coli        Culture - Blood (collected 29 Aug 2018 12:11)  Source: .Blood None  Preliminary Report (31 Aug 2018 01:02):    No growth to date.    Culture - Urine (collected 29 Aug 2018 04:50)  Source: .Urine Clean Catch (Midstream)  Preliminary Report (30 Aug 2018 17:15):    >100,000 CFU/ml Escherichia coli      Culture Results:   No growth to date. (08-29 @ 12:11)  Culture Results:   >100,000 CFU/ml Escherichia coli (08-29 @ 04:50)        RADIOLOGY & ADDITIONAL STUDIES:

## 2018-09-01 VITALS — HEART RATE: 87 BPM | DIASTOLIC BLOOD PRESSURE: 96 MMHG | SYSTOLIC BLOOD PRESSURE: 141 MMHG

## 2018-09-01 LAB
HCT VFR BLD CALC: 31.2 % — LOW (ref 37–47)
HGB BLD-MCNC: 10.6 G/DL — LOW (ref 12–16)
MCHC RBC-ENTMCNC: 26.7 PG — LOW (ref 27–31)
MCHC RBC-ENTMCNC: 34 G/DL — SIGNIFICANT CHANGE UP (ref 32–37)
MCV RBC AUTO: 78.6 FL — LOW (ref 81–99)
NRBC # BLD: 0 /100 WBCS — SIGNIFICANT CHANGE UP (ref 0–0)
PLATELET # BLD AUTO: 298 K/UL — SIGNIFICANT CHANGE UP (ref 130–400)
RBC # BLD: 3.97 M/UL — LOW (ref 4.2–5.4)
RBC # FLD: 11.7 % — SIGNIFICANT CHANGE UP (ref 11.5–14.5)
WBC # BLD: 13.1 K/UL — HIGH (ref 4.8–10.8)
WBC # FLD AUTO: 13.1 K/UL — HIGH (ref 4.8–10.8)

## 2018-09-01 RX ORDER — CEFPODOXIME PROXETIL 100 MG
1 TABLET ORAL
Qty: 14 | Refills: 0
Start: 2018-09-01 | End: 2018-09-07

## 2018-09-01 RX ADMIN — Medication 20 MILLIGRAM(S): at 05:19

## 2018-09-01 RX ADMIN — TACROLIMUS 2 MILLIGRAM(S): 5 CAPSULE ORAL at 05:19

## 2018-09-01 RX ADMIN — LOSARTAN POTASSIUM 50 MILLIGRAM(S): 100 TABLET, FILM COATED ORAL at 05:19

## 2018-09-01 RX ADMIN — Medication 200 MILLIGRAM(S): at 05:19

## 2018-09-01 RX ADMIN — Medication 400 MILLIGRAM(S): at 05:19

## 2018-09-01 NOTE — DISCHARGE NOTE ADULT - PATIENT PORTAL LINK FT
You can access the [facility] Patient Portal, offered by [facility], by registering with the following website: [email or web address]

## 2018-09-01 NOTE — DISCHARGE NOTE ADULT - CARE PROVIDER_API CALL
Katy Patrick), Rheumatology  [address] [place] [address]  Phone: [phone]  Fax: [phone]

## 2018-09-01 NOTE — DISCHARGE NOTE ADULT - SECONDARY DIAGNOSIS.
Systemic lupus erythematosus, unspecified SLE type, unspecified organ involvement status Hypertension, unspecified type Sepsis due to Escherichia coli

## 2018-09-01 NOTE — DISCHARGE NOTE ADULT - PLAN OF CARE
continue home meds follow up with Dr. Patrick within one week follow up with PMD treat infection complete course of antibiotics  please purchase over the counter probiotics and take with each meal for ten days complete course of antibiotics

## 2018-09-01 NOTE — DISCHARGE NOTE ADULT - MEDICATION SUMMARY - MEDICATIONS TO TAKE
I will START or STAY ON the medications listed below when I get home from the hospital:    predniSONE 20 mg oral tablet  -- 1 tab(s) by mouth once a day  -- Indication: For Systemic lupus erythematosus, unspecified SLE type, unspecified organ involvement status    aspirin  -- 81  by mouth once a day  -- Indication: For Systemic lupus erythematosus, unspecified SLE type, unspecified organ involvement status    losartan  -- 50  by mouth once a day  -- Indication: For Hypertension    Plaquenil  -- 200  by mouth 2 times a day  -- Indication: For Systemic lupus erythematosus, unspecified SLE type, unspecified organ involvement status    labetalol  -- 400 milligram(s) by mouth 2 times a day  -- Indication: For Hypertension    cefpodoxime 200 mg oral tablet  -- 1 tab(s) by mouth 2 times a day   -- Finish all this medication unless otherwise directed by prescriber.  Take with food or milk.    -- Indication: For Pyelonephritis    tacrolimus  -- 2  by mouth 2 times a day  -- Indication: For Systemic lupus erythematosus, unspecified SLE type, unspecified organ involvement status    CellCept 500 mg oral tablet  -- 1500  by mouth once a day  -- Indication: For Systemic lupus erythematosus, unspecified SLE type, unspecified organ involvement status

## 2018-09-01 NOTE — DISCHARGE NOTE ADULT - HOSPITAL COURSE
24yo female with history of lupus presents to the ER due to right lower quadrant pain rates it up to a 7/10 on pain scale. She denies fever or dysuria. Feels better today but complains of facial swelling related to her BP being elevated today.  S/p norvasc and lasix with improvment.  Patient found to have ecoli sensitive to Vantin.  Script sent to pharmacy       Problem/Plan - 1:  ·  Problem: Pyelonephritis.  Plan: on Ceftriaxone - d/c on PO vantin     Problem/Plan - 2:  ·  Problem: Sepsis due to Escherichia coli.  Plan: resolving  continue supportive care.      Problem/Plan - 3:  ·  Problem: Hypertension, unspecified type.  Plan: -continue labetalol and losartan  -stat dose of Norvasc and lasix given yesterday with improvement     Problem/Plan - 4:  ·  Problem: Lupus (systemic lupus erythematosus).  Plan: - continue home meds.   Follow up with rheumatology - Dr. Patrick in one week.    D/C planning took over 60 minutes

## 2018-09-01 NOTE — DISCHARGE NOTE ADULT - CARE PLAN
Principal Discharge DX:	Pyelonephritis  Goal:	treat infection  Assessment and plan of treatment:	complete course of antibiotics  please purchase over the counter probiotics and take with each meal for ten days  Secondary Diagnosis:	Sepsis due to Escherichia coli  Goal:	treat infection  Assessment and plan of treatment:	complete course of antibiotics  Secondary Diagnosis:	Systemic lupus erythematosus, unspecified SLE type, unspecified organ involvement status  Goal:	continue home meds  Assessment and plan of treatment:	follow up with Dr. Patrick within one week  Secondary Diagnosis:	Hypertension, unspecified type  Goal:	continue home meds  Assessment and plan of treatment:	follow up with [facility]

## 2018-09-04 LAB
CULTURE RESULTS: SIGNIFICANT CHANGE UP
SPECIMEN SOURCE: SIGNIFICANT CHANGE UP

## 2018-09-06 DIAGNOSIS — M32.9 SYSTEMIC LUPUS ERYTHEMATOSUS, UNSPECIFIED: ICD-10-CM

## 2018-09-06 DIAGNOSIS — N12 TUBULO-INTERSTITIAL NEPHRITIS, NOT SPECIFIED AS ACUTE OR CHRONIC: ICD-10-CM

## 2018-09-06 DIAGNOSIS — I10 ESSENTIAL (PRIMARY) HYPERTENSION: ICD-10-CM

## 2018-09-06 DIAGNOSIS — A41.51 SEPSIS DUE TO ESCHERICHIA COLI [E. COLI]: ICD-10-CM

## 2019-07-01 ENCOUNTER — OUTPATIENT (OUTPATIENT)
Dept: OUTPATIENT SERVICES | Facility: HOSPITAL | Age: 24
LOS: 1 days | End: 2019-07-01
Payer: MEDICAID

## 2019-07-01 PROCEDURE — G9001: CPT

## 2019-07-02 ENCOUNTER — INPATIENT (INPATIENT)
Facility: HOSPITAL | Age: 24
LOS: 2 days | Discharge: HOME | End: 2019-07-05
Attending: HOSPITALIST | Admitting: HOSPITALIST
Payer: MEDICAID

## 2019-07-02 VITALS
SYSTOLIC BLOOD PRESSURE: 135 MMHG | RESPIRATION RATE: 17 BRPM | HEART RATE: 107 BPM | DIASTOLIC BLOOD PRESSURE: 82 MMHG | OXYGEN SATURATION: 99 % | TEMPERATURE: 103 F

## 2019-07-02 LAB
ALBUMIN SERPL ELPH-MCNC: 3.1 G/DL — LOW (ref 3.5–5.2)
ALP SERPL-CCNC: 50 U/L — SIGNIFICANT CHANGE UP (ref 30–115)
ALT FLD-CCNC: 10 U/L — SIGNIFICANT CHANGE UP (ref 0–41)
ANION GAP SERPL CALC-SCNC: 12 MMOL/L — SIGNIFICANT CHANGE UP (ref 7–14)
APPEARANCE UR: CLEAR — SIGNIFICANT CHANGE UP
AST SERPL-CCNC: 13 U/L — SIGNIFICANT CHANGE UP (ref 0–41)
BACTERIA # UR AUTO: ABNORMAL /HPF
BASOPHILS # BLD AUTO: 0.02 K/UL — SIGNIFICANT CHANGE UP (ref 0–0.2)
BASOPHILS NFR BLD AUTO: 0.2 % — SIGNIFICANT CHANGE UP (ref 0–1)
BILIRUB SERPL-MCNC: 0.4 MG/DL — SIGNIFICANT CHANGE UP (ref 0.2–1.2)
BILIRUB UR-MCNC: NEGATIVE — SIGNIFICANT CHANGE UP
BUN SERPL-MCNC: 11 MG/DL — SIGNIFICANT CHANGE UP (ref 10–20)
CALCIUM SERPL-MCNC: 8.4 MG/DL — LOW (ref 8.5–10.1)
CHLORIDE SERPL-SCNC: 104 MMOL/L — SIGNIFICANT CHANGE UP (ref 98–110)
CO2 SERPL-SCNC: 24 MMOL/L — SIGNIFICANT CHANGE UP (ref 17–32)
COLOR SPEC: YELLOW — SIGNIFICANT CHANGE UP
CREAT SERPL-MCNC: 0.9 MG/DL — SIGNIFICANT CHANGE UP (ref 0.7–1.5)
DIFF PNL FLD: ABNORMAL
EOSINOPHIL # BLD AUTO: 0.02 K/UL — SIGNIFICANT CHANGE UP (ref 0–0.7)
EOSINOPHIL NFR BLD AUTO: 0.2 % — SIGNIFICANT CHANGE UP (ref 0–8)
EPI CELLS # UR: ABNORMAL /HPF
ERYTHROCYTE [SEDIMENTATION RATE] IN BLOOD: 50 MM/HR — HIGH (ref 0–20)
GLUCOSE SERPL-MCNC: 92 MG/DL — SIGNIFICANT CHANGE UP (ref 70–99)
GLUCOSE UR QL: NEGATIVE MG/DL — SIGNIFICANT CHANGE UP
HCT VFR BLD CALC: 34.6 % — LOW (ref 37–47)
HGB BLD-MCNC: 11.8 G/DL — LOW (ref 12–16)
IMM GRANULOCYTES NFR BLD AUTO: 0.5 % — HIGH (ref 0.1–0.3)
KETONES UR-MCNC: NEGATIVE — SIGNIFICANT CHANGE UP
LACTATE SERPL-SCNC: 1.1 MMOL/L — SIGNIFICANT CHANGE UP (ref 0.5–2.2)
LEUKOCYTE ESTERASE UR-ACNC: NEGATIVE — SIGNIFICANT CHANGE UP
LYMPHOCYTES # BLD AUTO: 1.7 K/UL — SIGNIFICANT CHANGE UP (ref 1.2–3.4)
LYMPHOCYTES # BLD AUTO: 15.1 % — LOW (ref 20.5–51.1)
MCHC RBC-ENTMCNC: 27.3 PG — SIGNIFICANT CHANGE UP (ref 27–31)
MCHC RBC-ENTMCNC: 34.1 G/DL — SIGNIFICANT CHANGE UP (ref 32–37)
MCV RBC AUTO: 80.1 FL — LOW (ref 81–99)
MONOCYTES # BLD AUTO: 1.68 K/UL — HIGH (ref 0.1–0.6)
MONOCYTES NFR BLD AUTO: 14.9 % — HIGH (ref 1.7–9.3)
NEUTROPHILS # BLD AUTO: 7.78 K/UL — HIGH (ref 1.4–6.5)
NEUTROPHILS NFR BLD AUTO: 69.1 % — SIGNIFICANT CHANGE UP (ref 42.2–75.2)
NITRITE UR-MCNC: NEGATIVE — SIGNIFICANT CHANGE UP
NRBC # BLD: 0 /100 WBCS — SIGNIFICANT CHANGE UP (ref 0–0)
PH UR: 6.5 — SIGNIFICANT CHANGE UP (ref 5–8)
PLATELET # BLD AUTO: 328 K/UL — SIGNIFICANT CHANGE UP (ref 130–400)
POTASSIUM SERPL-MCNC: 4.2 MMOL/L — SIGNIFICANT CHANGE UP (ref 3.5–5)
POTASSIUM SERPL-SCNC: 4.2 MMOL/L — SIGNIFICANT CHANGE UP (ref 3.5–5)
PROT SERPL-MCNC: 4.9 G/DL — LOW (ref 6–8)
PROT UR-MCNC: >=300 MG/DL
RBC # BLD: 4.32 M/UL — SIGNIFICANT CHANGE UP (ref 4.2–5.4)
RBC # FLD: 11.6 % — SIGNIFICANT CHANGE UP (ref 11.5–14.5)
RBC CASTS # UR COMP ASSIST: ABNORMAL /HPF
SODIUM SERPL-SCNC: 140 MMOL/L — SIGNIFICANT CHANGE UP (ref 135–146)
SP GR SPEC: 1.01 — SIGNIFICANT CHANGE UP (ref 1.01–1.03)
UROBILINOGEN FLD QL: 0.2 MG/DL — SIGNIFICANT CHANGE UP (ref 0.2–0.2)
WBC # BLD: 11.26 K/UL — HIGH (ref 4.8–10.8)
WBC # FLD AUTO: 11.26 K/UL — HIGH (ref 4.8–10.8)

## 2019-07-02 PROCEDURE — 99291 CRITICAL CARE FIRST HOUR: CPT

## 2019-07-02 PROCEDURE — 73701 CT LOWER EXTREMITY W/DYE: CPT | Mod: 26,LT

## 2019-07-02 PROCEDURE — 74177 CT ABD & PELVIS W/CONTRAST: CPT | Mod: 26

## 2019-07-02 PROCEDURE — 93971 EXTREMITY STUDY: CPT | Mod: 26,LT

## 2019-07-02 RX ORDER — PIPERACILLIN AND TAZOBACTAM 4; .5 G/20ML; G/20ML
3.38 INJECTION, POWDER, LYOPHILIZED, FOR SOLUTION INTRAVENOUS ONCE
Refills: 0 | Status: COMPLETED | OUTPATIENT
Start: 2019-07-02 | End: 2019-07-02

## 2019-07-02 RX ORDER — VANCOMYCIN HCL 1 G
1500 VIAL (EA) INTRAVENOUS ONCE
Refills: 0 | Status: DISCONTINUED | OUTPATIENT
Start: 2019-07-02 | End: 2019-07-02

## 2019-07-02 RX ORDER — VANCOMYCIN HCL 1 G
1000 VIAL (EA) INTRAVENOUS ONCE
Refills: 0 | Status: COMPLETED | OUTPATIENT
Start: 2019-07-02 | End: 2019-07-02

## 2019-07-02 RX ORDER — SODIUM CHLORIDE 9 MG/ML
2000 INJECTION, SOLUTION INTRAVENOUS ONCE
Refills: 0 | Status: COMPLETED | OUTPATIENT
Start: 2019-07-02 | End: 2019-07-02

## 2019-07-02 RX ORDER — MORPHINE SULFATE 50 MG/1
4 CAPSULE, EXTENDED RELEASE ORAL ONCE
Refills: 0 | Status: DISCONTINUED | OUTPATIENT
Start: 2019-07-02 | End: 2019-07-02

## 2019-07-02 RX ORDER — ACETAMINOPHEN 500 MG
650 TABLET ORAL ONCE
Refills: 0 | Status: COMPLETED | OUTPATIENT
Start: 2019-07-02 | End: 2019-07-02

## 2019-07-02 RX ADMIN — MORPHINE SULFATE 4 MILLIGRAM(S): 50 CAPSULE, EXTENDED RELEASE ORAL at 21:11

## 2019-07-02 RX ADMIN — Medication 650 MILLIGRAM(S): at 23:49

## 2019-07-02 RX ADMIN — PIPERACILLIN AND TAZOBACTAM 200 GRAM(S): 4; .5 INJECTION, POWDER, LYOPHILIZED, FOR SOLUTION INTRAVENOUS at 20:27

## 2019-07-02 RX ADMIN — MORPHINE SULFATE 4 MILLIGRAM(S): 50 CAPSULE, EXTENDED RELEASE ORAL at 23:48

## 2019-07-02 RX ADMIN — SODIUM CHLORIDE 2000 MILLILITER(S): 9 INJECTION, SOLUTION INTRAVENOUS at 21:11

## 2019-07-02 RX ADMIN — Medication 250 MILLIGRAM(S): at 21:11

## 2019-07-02 RX ADMIN — SODIUM CHLORIDE 2000 MILLILITER(S): 9 INJECTION, SOLUTION INTRAVENOUS at 20:28

## 2019-07-02 RX ADMIN — Medication 1000 MILLIGRAM(S): at 23:44

## 2019-07-02 RX ADMIN — MORPHINE SULFATE 4 MILLIGRAM(S): 50 CAPSULE, EXTENDED RELEASE ORAL at 20:28

## 2019-07-02 RX ADMIN — PIPERACILLIN AND TAZOBACTAM 3.38 GRAM(S): 4; .5 INJECTION, POWDER, LYOPHILIZED, FOR SOLUTION INTRAVENOUS at 21:11

## 2019-07-02 NOTE — ED PROVIDER NOTE - OBJECTIVE STATEMENT
24F h/o SLE on prednisone, plaquenil, tacrolimus p/w left thigh pain. Pain from left hip to left knee, however mostly in left thigh. Started last night with no inciting factor(s). Admits to marked difficulty ambulating and ranging LLE. Admits to fever, chills, left lower back pain. Denies CP, SOB, abd pain, n/v/d, dysuria. Denies recent surgery, immobilization, known malignancy.

## 2019-07-02 NOTE — ED PROVIDER NOTE - PHYSICAL EXAMINATION
Constitutional: Well developed, well nourished. NAD. Good general hygiene  Head: Atraumatic.  Eyes: PERRLA. EOMI without discomfort.   ENT: No nasal discharge. Mucous membranes moist.  Neck: Supple. Painless ROM.  Cardiovascular: Regular rhythm. Regular rate. Normal S1 and S2. No murmurs. 2+ pulses in all extremities.   Pulmonary: Normal respiratory rate and effort. Lungs clear to auscultation bilaterally. No wheezing, rales, or rhonchi. Bilateral, equal lung expansion.   Abdominal: Soft. Nondistended. Nontender. No rebound or guarding.   Extremities: Left thigh swelling > right thigh. TTP overlying left thigh. Marked pain w/ passive and active ROM of LLE. No tenderness w/ palpation of left hip or left knee.  Skin: No rashes or skin changes overlying left thigh.  Neuro: AAOx3. Sensation intact throughout LLE.  Psych: Normal mood. Normal affect.

## 2019-07-02 NOTE — ED PROVIDER NOTE - PROGRESS NOTE DETAILS
I spoke to surgery about the consult and they are aware and recommend ct imaging and labs. Signed out to BRADFORD Rapp. Pending labs, urine, CT, doppler. dvt study negative as per tech ortho consulted surgery also consulted.  ortho spoke to radiology about possibility of deep space abscess vs joint infection ortho had requested MRI.  I spoke to radiology, Unable to get MRI at this time. I spoke to ortho attending who is coming to the ED now from home to see the patient. pt seen by ortho attending bedside, who does not believe pt has a septic joint at this time based on his exam.  pt able to bear weight and pain is more in the thigh.  he reuqests admission to medicine and MRI from hip to the knee with and without contrast.  ortho will follow pt and will re examine pt in the morning. I informed pt of CT recommendation for MRCP.  pt reports she is aware of abnormal finding on gallbladder vs duct and need for further testing.  she was supposed to get testing as outpatient and never got it.  pt is aware.  I informed her of need for MRCP.  pt is aware.

## 2019-07-02 NOTE — ED PROVIDER NOTE - NS ED ROS FT
General: fever, chills.   Eyes:  No visual changes, eye pain or discharge.  ENMT:  No hearing changes, pain, no sore throat or runny nose, no difficulty swallowing  Cardiac:  No chest pain, SOB or edema. No chest pain with exertion.  Respiratory:  No cough or respiratory distress. No hemoptysis. No history of asthma or RAD.  GI:  No nausea, vomiting, diarrhea or abdominal pain.  :  No dysuria, frequency or burning.  MS:  Left thigh pain. Pain w/ ambulating. Pain w/ ROM.  Neuro:  No headache or weakness.  No LOC. no numbness/parasthesias in LLE.  Skin:  No skin rash.   Endocrine: No history of thyroid disease or diabetes.

## 2019-07-02 NOTE — ED PROVIDER NOTE - ATTENDING CONTRIBUTION TO CARE
23 yo f with pmh of lupus on prednisone and plaquenil  presents with significant left thigh and left lower back pain and thigh swelling since last night, worsening.  pain is to the point she cannot ambulate or bear weight.  Pt also with fever.  no chills.  no cp, no sob, no abd pain, no urinary complaints, no numbness, no weakness.  pain worse with movement.    awake, alert.  neck supple.  pt breathing comfortably.  abd soft, nontender.  + left lower back tenderness.  NO hip tendernress.  no pain with active or passive rom of the left hip.    No knee tenderness, no knee swelling.  + swelling to the thigh, no erythema, no crepitus.   + pain in the thigh with rom.  a/p:  concern for possible deep soft tissue infection.  p:  labs, ct, iv abx, pain control, reassess, surgery consult.

## 2019-07-02 NOTE — ED PROVIDER NOTE - CLINICAL SUMMARY MEDICAL DECISION MAKING FREE TEXT BOX
Pt with history of Lupus on prednisone and immunomodulators presents with one day of fever and left thigh/hip pain and difficulty bearing weight.  no abd pain, no nausea, no vomiting, no cp, no sob, no headache.  pt septic on arrival.  pt given ivf, broad spectrum abx.  surgery consulted.  pt had CT of abd/pelvis and left thigh.  CT with concern for hip effusion.  ortho consulted.  pt seen by ortho attending bedside, who does not feel pt needs to go to OR at this time.  pt given iv abx.  pt ordered for steroids as pt is on daily immunosuppressant.  Pt admitted to medicine as per ortho.  pt to get mri as per ortho.  pt endorsed to MAR who is aware of need for mri.  ortho will follow pt as inpatient.  pt was able to bear weight for ortho attending in the ED.  DVT study negative.

## 2019-07-03 PROBLEM — I10 ESSENTIAL (PRIMARY) HYPERTENSION: Chronic | Status: ACTIVE | Noted: 2018-08-29

## 2019-07-03 LAB
ALBUMIN SERPL ELPH-MCNC: 2.8 G/DL — LOW (ref 3.5–5.2)
ALP SERPL-CCNC: 49 U/L — SIGNIFICANT CHANGE UP (ref 30–115)
ALT FLD-CCNC: 9 U/L — SIGNIFICANT CHANGE UP (ref 0–41)
ANION GAP SERPL CALC-SCNC: 12 MMOL/L — SIGNIFICANT CHANGE UP (ref 7–14)
APTT BLD: 38.6 SEC — SIGNIFICANT CHANGE UP (ref 27–39.2)
AST SERPL-CCNC: 13 U/L — SIGNIFICANT CHANGE UP (ref 0–41)
BASOPHILS # BLD AUTO: 0.01 K/UL — SIGNIFICANT CHANGE UP (ref 0–0.2)
BASOPHILS NFR BLD AUTO: 0.1 % — SIGNIFICANT CHANGE UP (ref 0–1)
BILIRUB SERPL-MCNC: 0.3 MG/DL — SIGNIFICANT CHANGE UP (ref 0.2–1.2)
BLD GP AB SCN SERPL QL: SIGNIFICANT CHANGE UP
BUN SERPL-MCNC: 16 MG/DL — SIGNIFICANT CHANGE UP (ref 10–20)
CALCIUM SERPL-MCNC: 9 MG/DL — SIGNIFICANT CHANGE UP (ref 8.5–10.1)
CHLORIDE SERPL-SCNC: 105 MMOL/L — SIGNIFICANT CHANGE UP (ref 98–110)
CK SERPL-CCNC: 74 U/L — SIGNIFICANT CHANGE UP (ref 0–225)
CO2 SERPL-SCNC: 24 MMOL/L — SIGNIFICANT CHANGE UP (ref 17–32)
CREAT SERPL-MCNC: 0.9 MG/DL — SIGNIFICANT CHANGE UP (ref 0.7–1.5)
CRP SERPL-MCNC: 5.84 MG/DL — HIGH (ref 0–0.4)
D DIMER BLD IA.RAPID-MCNC: 167 NG/ML DDU — SIGNIFICANT CHANGE UP (ref 0–230)
EOSINOPHIL # BLD AUTO: 0 K/UL — SIGNIFICANT CHANGE UP (ref 0–0.7)
EOSINOPHIL NFR BLD AUTO: 0 % — SIGNIFICANT CHANGE UP (ref 0–8)
GLUCOSE SERPL-MCNC: 124 MG/DL — HIGH (ref 70–99)
HCT VFR BLD CALC: 33.8 % — LOW (ref 37–47)
HGB BLD-MCNC: 11.4 G/DL — LOW (ref 12–16)
IMM GRANULOCYTES NFR BLD AUTO: 0.6 % — HIGH (ref 0.1–0.3)
INR BLD: 1.04 RATIO — SIGNIFICANT CHANGE UP (ref 0.65–1.3)
LYMPHOCYTES # BLD AUTO: 0.38 K/UL — LOW (ref 1.2–3.4)
LYMPHOCYTES # BLD AUTO: 4 % — LOW (ref 20.5–51.1)
MCHC RBC-ENTMCNC: 26.8 PG — LOW (ref 27–31)
MCHC RBC-ENTMCNC: 33.7 G/DL — SIGNIFICANT CHANGE UP (ref 32–37)
MCV RBC AUTO: 79.3 FL — LOW (ref 81–99)
MONOCYTES # BLD AUTO: 0.16 K/UL — SIGNIFICANT CHANGE UP (ref 0.1–0.6)
MONOCYTES NFR BLD AUTO: 1.7 % — SIGNIFICANT CHANGE UP (ref 1.7–9.3)
NEUTROPHILS # BLD AUTO: 8.92 K/UL — HIGH (ref 1.4–6.5)
NEUTROPHILS NFR BLD AUTO: 93.6 % — HIGH (ref 42.2–75.2)
NRBC # BLD: 0 /100 WBCS — SIGNIFICANT CHANGE UP (ref 0–0)
PLATELET # BLD AUTO: 332 K/UL — SIGNIFICANT CHANGE UP (ref 130–400)
POTASSIUM SERPL-MCNC: 4.6 MMOL/L — SIGNIFICANT CHANGE UP (ref 3.5–5)
POTASSIUM SERPL-SCNC: 4.6 MMOL/L — SIGNIFICANT CHANGE UP (ref 3.5–5)
PROCALCITONIN SERPL-MCNC: 0.14 NG/ML — HIGH (ref 0.02–0.1)
PROT SERPL-MCNC: 4.9 G/DL — LOW (ref 6–8)
PROTHROM AB SERPL-ACNC: 11.9 SEC — SIGNIFICANT CHANGE UP (ref 9.95–12.87)
RBC # BLD: 4.26 M/UL — SIGNIFICANT CHANGE UP (ref 4.2–5.4)
RBC # FLD: 11.6 % — SIGNIFICANT CHANGE UP (ref 11.5–14.5)
SODIUM SERPL-SCNC: 141 MMOL/L — SIGNIFICANT CHANGE UP (ref 135–146)
WBC # BLD: 9.53 K/UL — SIGNIFICANT CHANGE UP (ref 4.8–10.8)
WBC # FLD AUTO: 9.53 K/UL — SIGNIFICANT CHANGE UP (ref 4.8–10.8)

## 2019-07-03 PROCEDURE — 99223 1ST HOSP IP/OBS HIGH 75: CPT | Mod: AI

## 2019-07-03 RX ORDER — ASPIRIN/CALCIUM CARB/MAGNESIUM 324 MG
81 TABLET ORAL DAILY
Refills: 0 | Status: DISCONTINUED | OUTPATIENT
Start: 2019-07-03 | End: 2019-07-05

## 2019-07-03 RX ORDER — TRAMADOL HYDROCHLORIDE 50 MG/1
50 TABLET ORAL EVERY 6 HOURS
Refills: 0 | Status: DISCONTINUED | OUTPATIENT
Start: 2019-07-03 | End: 2019-07-05

## 2019-07-03 RX ORDER — ACETAMINOPHEN 500 MG
650 TABLET ORAL EVERY 4 HOURS
Refills: 0 | Status: DISCONTINUED | OUTPATIENT
Start: 2019-07-03 | End: 2019-07-05

## 2019-07-03 RX ORDER — OXYCODONE AND ACETAMINOPHEN 5; 325 MG/1; MG/1
1 TABLET ORAL EVERY 6 HOURS
Refills: 0 | Status: DISCONTINUED | OUTPATIENT
Start: 2019-07-03 | End: 2019-07-05

## 2019-07-03 RX ORDER — DOCUSATE SODIUM 100 MG
100 CAPSULE ORAL THREE TIMES A DAY
Refills: 0 | Status: DISCONTINUED | OUTPATIENT
Start: 2019-07-03 | End: 2019-07-05

## 2019-07-03 RX ORDER — MYCOPHENOLATE MOFETIL 250 MG/1
1500 CAPSULE ORAL DAILY
Refills: 0 | Status: DISCONTINUED | OUTPATIENT
Start: 2019-07-03 | End: 2019-07-05

## 2019-07-03 RX ORDER — LABETALOL HCL 100 MG
400 TABLET ORAL
Refills: 0 | Status: DISCONTINUED | OUTPATIENT
Start: 2019-07-03 | End: 2019-07-05

## 2019-07-03 RX ORDER — ACETAMINOPHEN 500 MG
650 TABLET ORAL EVERY 6 HOURS
Refills: 0 | Status: DISCONTINUED | OUTPATIENT
Start: 2019-07-03 | End: 2019-07-05

## 2019-07-03 RX ORDER — TACROLIMUS 5 MG/1
2 CAPSULE ORAL
Qty: 0 | Refills: 0 | DISCHARGE

## 2019-07-03 RX ORDER — HYDROXYCHLOROQUINE SULFATE 200 MG
200 TABLET ORAL
Refills: 0 | Status: DISCONTINUED | OUTPATIENT
Start: 2019-07-03 | End: 2019-07-05

## 2019-07-03 RX ORDER — CHLORHEXIDINE GLUCONATE 213 G/1000ML
1 SOLUTION TOPICAL
Refills: 0 | Status: DISCONTINUED | OUTPATIENT
Start: 2019-07-03 | End: 2019-07-05

## 2019-07-03 RX ORDER — SENNA PLUS 8.6 MG/1
2 TABLET ORAL AT BEDTIME
Refills: 0 | Status: DISCONTINUED | OUTPATIENT
Start: 2019-07-03 | End: 2019-07-05

## 2019-07-03 RX ORDER — ENOXAPARIN SODIUM 100 MG/ML
40 INJECTION SUBCUTANEOUS DAILY
Refills: 0 | Status: DISCONTINUED | OUTPATIENT
Start: 2019-07-03 | End: 2019-07-05

## 2019-07-03 RX ORDER — PANTOPRAZOLE SODIUM 20 MG/1
40 TABLET, DELAYED RELEASE ORAL
Refills: 0 | Status: DISCONTINUED | OUTPATIENT
Start: 2019-07-03 | End: 2019-07-05

## 2019-07-03 RX ORDER — KETOROLAC TROMETHAMINE 30 MG/ML
15 SYRINGE (ML) INJECTION ONCE
Refills: 0 | Status: DISCONTINUED | OUTPATIENT
Start: 2019-07-03 | End: 2019-07-03

## 2019-07-03 RX ORDER — LOSARTAN POTASSIUM 100 MG/1
50 TABLET, FILM COATED ORAL DAILY
Refills: 0 | Status: DISCONTINUED | OUTPATIENT
Start: 2019-07-03 | End: 2019-07-05

## 2019-07-03 RX ADMIN — MORPHINE SULFATE 4 MILLIGRAM(S): 50 CAPSULE, EXTENDED RELEASE ORAL at 02:15

## 2019-07-03 RX ADMIN — Medication 400 MILLIGRAM(S): at 06:45

## 2019-07-03 RX ADMIN — Medication 650 MILLIGRAM(S): at 02:16

## 2019-07-03 RX ADMIN — MYCOPHENOLATE MOFETIL 1500 MILLIGRAM(S): 250 CAPSULE ORAL at 11:22

## 2019-07-03 RX ADMIN — PANTOPRAZOLE SODIUM 40 MILLIGRAM(S): 20 TABLET, DELAYED RELEASE ORAL at 08:17

## 2019-07-03 RX ADMIN — LOSARTAN POTASSIUM 50 MILLIGRAM(S): 100 TABLET, FILM COATED ORAL at 06:45

## 2019-07-03 RX ADMIN — Medication 125 MILLIGRAM(S): at 02:54

## 2019-07-03 RX ADMIN — Medication 200 MILLIGRAM(S): at 06:45

## 2019-07-03 RX ADMIN — Medication 15 MILLIGRAM(S): at 02:54

## 2019-07-03 RX ADMIN — Medication 81 MILLIGRAM(S): at 11:21

## 2019-07-03 RX ADMIN — Medication 60 MILLIGRAM(S): at 06:46

## 2019-07-03 RX ADMIN — ENOXAPARIN SODIUM 40 MILLIGRAM(S): 100 INJECTION SUBCUTANEOUS at 11:21

## 2019-07-03 RX ADMIN — Medication 200 MILLIGRAM(S): at 17:06

## 2019-07-03 NOTE — H&P ADULT - HISTORY OF PRESENT ILLNESS
25 yo female with PMHx of SLE, HTN (secondary to Lupus) presents to ED with CC of Left LE pain. 25 yo female with PMHx of SLE, HTN (secondary to Lupus) presents to ED with CC of Left LE pain. Pain started yesterday morning, as a sore feeling in the leg, but got progressively worse during the day. Pain was constant, located in the left thigh above the knee joint and little below the hip joint. Pain was constant, sharp along with come tightness in the thigh, ranging from 8-10/10 in intensity, radiating to the back and the left buttock. Pain was aggravated by increased movement and relieved partially by tylenol and vicodin (put pt to sleep but when she woke up- pain came back). Pt reports increased swelling and tenderness to touch but no weakness, numbness, tingling, erythema, or discharge. Pt denies any similar episodes in the past.  Previous hospitalization was in September 2018 for UTI/pyelonephritis.     Pt denied shortness of breath, pain anywhere else, palpitations, nausea/vomiting, dizziness, LOC. Pt does not report any trauma or fall prior to the episode.     In the ED, Pt is s/p 1 dose of Zosyn and vancomycin. Vitals were significant for fever 102.5F. CT LLE w/ IV contrast showed left hip effusion. No evidence of fracture dislocation or vascular issue. Pt was evaluated by Ortho team- Low suspicion of septic arthritis, request MRI LLE w/ IV contrast. Surgery is not planning any acute surgical intervention.

## 2019-07-03 NOTE — H&P ADULT - NSHPPHYSICALEXAM_GEN_ALL_CORE
PHYSICAL EXAM:  GENERAL: NAD, speaks in full sentences, no signs of respiratory distress  HEAD:  Atraumatic, Normocephalic  EYES: EOMI, PERRLA, conjunctiva and sclera clear  NECK: Supple, No JVD  CHEST/LUNG: Clear to auscultation bilaterally; No wheeze; No crackles; No accessory muscles used  HEART: Regular rate and rhythm; No murmurs;   ABDOMEN: Soft, Nontender, Nondistended; Bowel sounds present; No guarding  EXTREMITIES:  2+ Peripheral Pulses, No cyanosis or edema  PSYCH: AAOx3  NEUROLOGY: non-focal  SKIN: No rashes or lesions PHYSICAL EXAM:  GENERAL: NAD, speaks in full sentences, no signs of respiratory distress  HEAD:  Atraumatic, Normocephalic  EYES: EOMI, PERRLA, conjunctiva and sclera clear  NECK: Supple, No JVD  CHEST/LUNG: Clear to auscultation bilaterally; No wheeze; No crackles; No accessory muscles used  HEART: Regular rate and rhythm; No murmurs;   ABDOMEN: Soft, Nontender, Nondistended; Bowel sounds present; No guarding  EXTREMITIES:  Warmth noted lateral thigh; Swelling of L thigh globally vs. contralateral thigh; tenderness to palpation+ left thigh; No pain with passive or active ROM of L hip; Difficulties with SLR, and maintaining SLR  PSYCH: AAOx3  NEUROLOGY: non-focal  SKIN: No rashes or lesions PHYSICAL EXAM:  GENERAL: NAD, speaks in full sentences, no signs of respiratory distress  HEAD:  Atraumatic, Normocephalic  EYES: EOMI, PERRLA, conjunctiva and sclera clear  NECK: Supple, No JVD  CHEST/LUNG: Clear to auscultation bilaterally; No wheeze; No crackles; No accessory muscles used  HEART: Regular rate and rhythm; No murmurs;   ABDOMEN: Soft, Nontender, Nondistended; Bowel sounds present; No guarding  EXTREMITIES:  Warmth noted lateral thigh; Swelling of L thigh globally vs. contralateral thigh; Tenderness to palpation+ left thigh; +pain with knee flexion and extension and hip flexion/extension  PSYCH: AAOx3; Normal judgement and memory  NEUROLOGY: non-focal; Motor strength 5/5 b/l LE; Sensation intact to light touch.   SKIN: No rashes or lesions

## 2019-07-03 NOTE — H&P ADULT - ASSESSMENT
23 yo female with PMHx of SLE, HTN (secondary to Lupus) presents to ED with CC of Left LE pain.     #Left thigh pain  -likely 2/2 myositis due to SLE flare vs glucocorticoid induced (was recently increased outpt). Doubt infectious etiology (soft tissue infection vs joint space infection). No evidence of soft tissue necrotizing infection, vascular injury, soft tissue hematoma, fluid collection, displaced fracture or dislocation on CT. CT showed left hip effusion.    -Fever 102.5F on admission. Leukocytosis+ (could be steroid induced vs infectious?). No sepsis at admission.   -s/p Zosyn and vancomycin in ED. Will not start on abx for now. Consider ID eval if persistent fever and leukocytosis  -Will start on Solumedrol 60mg [id number] for now. Can switch to home dose of prednisone 50mg daily  -ESR 50; f/u CRP, CK, Aldolase, Procalcitonin levels, D-dimer levels.   -Ortho: Low level of suspicion for septic arthritis; MRI LLE w/ IV contrast (including Hip joint) requested. Pt has copper IUD in place (tried calling MRI- no answer; please call MRI before placing the order)  -Surgery: no acute surgical intervention    #SLE- c/w Plaquenil, Cellcept. On Solumedrol for now- switch to home dose of prednisone when asymptomatic  #HTN-c/w labetalol, losartan. DASH diet when able to eat  #Normocytic anemia- Likely anemia of chronic disease 2/2 SLE; Monitor H/H; no signs of active GI bleed.   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     #DVT ppx: Lovenox  #GI ppx: Protonix  #Diet: NPO for now (ortho might plan intervention after MRI)  #Activity: AAT  #FULL code  #CHG  #Dispo as per ortho; from home    ***PLEASE CONFIRM MEDICATIONS IN THE AM.

## 2019-07-03 NOTE — CONSULT NOTE ADULT - SUBJECTIVE AND OBJECTIVE BOX
ORTHO CONSULT NOTE    24F with PMH of Lupus on prednisone, plaquenil, tacrolimus presented to ED c/o left thigh pain. Pain started yesterday with difficulty ambulating. Pain located around the lateral aspect of the left thigh proximally and radiates distally to the distal femur. Denies fevers or chills however ED reported a measure of 102.5 upon arrival. CT scan reads left hip effusion. Ortho is consulted for evaluation.       PAST MEDICAL & SURGICAL HISTORY:  Hypertension  Lupus (systemic lupus erythematosus)      Allergies    No Known Allergies    Intolerances      Home Medications:  aspirin: 81  orally once a day (29 Aug 2018 06:52)  CellCept 500 mg oral tablet: 1500  orally once a day (29 Aug 2018 06:52)  labetalol: 400 milligram(s) orally 2 times a day (29 Aug 2018 06:52)  losartan: 50  orally once a day (29 Aug 2018 06:52)  Plaquenil: 200  orally 2 times a day (29 Aug 2018 06:52)  predniSONE 20 mg oral tablet: 1 tab(s) orally once a day (29 Aug 2018 06:52)  tacrolimus: 2  orally 2 times a day (29 Aug 2018 06:52)      ICU Vital Signs Last 24 Hrs  T(C): 37.3 (02 Jul 2019 23:45), Max: 39.2 (02 Jul 2019 18:41)  T(F): 99.2 (02 Jul 2019 23:45), Max: 102.5 (02 Jul 2019 18:41)  HR: 103 (02 Jul 2019 23:45) (103 - 107)  BP: 121/69 (02 Jul 2019 23:45) (121/69 - 135/82)  BP(mean): --  ABP: --  ABP(mean): --  RR: 18 (02 Jul 2019 23:45) (17 - 18)  SpO2: 98% (02 Jul 2019 23:45) (98% - 99%)                          11.8   11.26 )-----------( 328      ( 02 Jul 2019 20:05 )             34.6     07-02    140  |  104  |  11  ----------------------------<  92  4.2   |  24  |  0.9    Ca    8.4<L>      02 Jul 2019 20:05    TPro  4.9<L>  /  Alb  3.1<L>  /  TBili  0.4  /  DBili  x   /  AST  13  /  ALT  10  /  AlkPhos  50  07-02        PE:    LLE  Warmth noted lateral thigh  Swelling of L thigh globally vs. contralateral thigh  TTP lateral thigh, mostly around 10 cm distal to GT  No groin pain  NTTP GT  No pain with passive or active ROM of L hip  Difficulties with SLR, and maintaining SLR  SILT distally  Motor intact EHL/FHL/TA  WWP        Imaging:  LLE CT: inconclusive.       A/P: 24F with L Thigh pain and PMH of Lupus on Prednisone  - Monitor vital signs  - Complete inflammatory markers: CRP, PCT, ESR, WBC  - Pain control  - Recommend stat LLE MRI w and w/o contrast  - Please obtain AP Pelvis and L Femur xrays  - Ortho to follow

## 2019-07-03 NOTE — H&P ADULT - NSHPREVIEWOFSYSTEMS_GEN_ALL_CORE
REVIEW OF SYSTEMS    CONSTITUTIONAL: No weakness, fevers or chills  RESPIRATORY: No cough, wheezing, hemoptysis; No shortness of breath  CARDIOVASCULAR: No chest pain or palpitations  GASTROINTESTINAL: No abdominal or epigastric pain. No nausea, vomiting, or hematemesis; No diarrhea or constipation. No melena or hematochezia.  GENITOURINARY: No dysuria, frequency or hematuria  NEUROLOGICAL: No numbness or weakness  SKIN: No itching, rashes REVIEW OF SYSTEMS    CONSTITUTIONAL: No weakness, fevers or chills  RESPIRATORY: No cough, wheezing, hemoptysis; No shortness of breath  CARDIOVASCULAR: No chest pain or palpitations  GASTROINTESTINAL: No abdominal or epigastric pain. No nausea, vomiting, or hematemesis; No diarrhea or constipation. No melena or hematochezia.  GENITOURINARY: No dysuria, frequency or hematuria  NEUROLOGICAL: No numbness or weakness  MSK- Tenderness to touch left thigh+; Warmth and swelling+ left thigh, No groin pain.   SKIN: No itching, rashes

## 2019-07-03 NOTE — CONSULT NOTE ADULT - ATTENDING COMMENTS
Pt. seen/ examined emergently  Significant improvement on pain medication  Able to get up and walk a few steps with pain in anterior and lateral distal thigh  No pain in the knee  No pain in the groin  Able to lay flat and fulli extend hip and knee  Unable to straight leg raise due to pain in lateral thigh  Significant TTP over anterior and lateral muscles in mid-thigh, no TTP proximally, in the groin, or in the knee  CT reviewed  Questionable hip effusion  Questionable vastus lateralis edema    Discussed case w/ ED attending  Low level of suspicion for septic arthritis of the hip at this time  Likely myositis  Continue with anti-inflammatory therapy, NSAIDs depending on renal function  Obtain MRI of the L thigh w/wo contrast, to include the hip joint  Will follow and re-examine  Keep patient NPO in case there are changes in presentation/ disposition
pt examined   left hip pain   left thing pain  ct shows intraarticular fluid collection  management as per ortho  no general surgery input for now.   call as needed.

## 2019-07-03 NOTE — CONSULT NOTE ADULT - SUBJECTIVE AND OBJECTIVE BOX
Consultation Note  =====================================================    HPI: 24y Female  HPI:      PAST MEDICAL & SURGICAL HISTORY:  Hypertension  Lupus (systemic lupus erythematosus)    Home Meds: Home Medications:  aspirin: 81  orally once a day (29 Aug 2018 06:52)  CellCept 500 mg oral tablet: 1500  orally once a day (29 Aug 2018 06:)  labetalol: 400 milligram(s) orally 2 times a day (29 Aug 2018 06:)  losartan: 50  orally once a day (29 Aug 2018 06:)  Plaquenil: 200  orally 2 times a day (29 Aug 2018 06:)  predniSONE 20 mg oral tablet: 1 tab(s) orally once a day (29 Aug 2018 06:)  tacrolimus: 2  orally 2 times a day (29 Aug 2018 06:)    Allergies: Allergies    No Known Allergies    Intolerances      Soc:   Denies Tobacco/EtOH/Substance use  Tobacco: [  ] yes  EtOH: [  ] yes  Substance use: [  ] yes  Advanced Directives: Presumed Full Code     ROS:    REVIEW OF SYSTEMS    [ ] A ten-point review of systems was otherwise negative except as noted.  [ ] Due to altered mental status/intubation, subjective information were not able to be obtained from the patient. History was obtained, to the extent possible, from review of the chart and collateral sources of information.      CURRENT MEDICATIONS:   --------------------------------------------------------------------------------------  Neurologic Medications    Respiratory Medications    Cardiovascular Medications    Gastrointestinal Medications    Genitourinary Medications    Hematologic/Oncologic Medications    Antimicrobial/Immunologic Medications    Endocrine/Metabolic Medications    Topical/Other Medications    --------------------------------------------------------------------------------------    VITAL SIGNS, INS/OUTS (last 24 hours):  --------------------------------------------------------------------------------------  ICU Vital Signs Last 24 Hrs  T(C): 37.3 (2019 23:45), Max: 39.2 (2019 18:41)  T(F): 99.2 (2019 23:45), Max: 102.5 (2019 18:41)  HR: 103 (2019 23:45) (103 - 107)  BP: 121/69 (2019 23:45) (121/69 - 135/82)  BP(mean): --  ABP: --  ABP(mean): --  RR: 18 (2019 23:45) (17 - 18)  SpO2: 98% (2019 23:45) (98% - 99%)    I&O's Summary    2019 07:01  -  2019 03:30  --------------------------------------------------------  IN: 2000 mL / OUT: 0 mL / NET: 2000 mL      --------------------------------------------------------------------------------------  PHYSICAL EXAM  General: NAD, AAOx3, calm and cooperative  HEENT: NCAT, LEANN, EOMI, Trachea ML, Neck supple  Cardiac: RRR S1, S2, no Murmurs, rubs or gallops  Respiratory: CTAB, normal respiratory effort, breath sounds equal BL, no wheeze, rhonchi or crackles  Abdomen: Soft, non-distended, non-tender, +bowel sounds  Rectal: Good tone, +stool, no blood, no padma-anal masses/lesions, no fistulas, fissures, hemorrhoids  Musculoskeletal: Strength 5/5 BL UE/LE, ROM intact, compartments soft  Neuro: Sensation grossly intact and equal throughout, CN II-XII intact, no focal deficits  Vascular: Pulses 2+ throughout, extremities well perfused  Skin: Warm/dry, normal color, no jaundice  Incision/wound: healing well, dressings in place, clean, dry and intact    LABS  --------------------------------------------------------------------------------------  Labs:  CAPILLARY BLOOD GLUCOSE                              11.8   11.26 )-----------( 328      ( 2019 20:05 )             34.6       Auto Neutrophil %: 69.1 % (19 @ 20:05)  Auto Immature Granulocyte %: 0.5 % (19 @ 20:05)        140  |  104  |  11  ----------------------------<  92  4.2   |  24  |  0.9      Calcium, Total Serum: 8.4 mg/dL (19 @ 20:05)      LFTs:             4.9  | 0.4  | 13       ------------------[50      ( 2019 20:05 )  3.1  | x    | 10          Lipase:x      Amylase:x         Lactate, Blood: 1.1 mmol/L (19 @ 20:30)    Coags: x    Urinalysis Basic - ( 2019 20:30 )  Color: Yellow / Appearance: Clear / S.015 / pH: x  Gluc: x / Ketone: Negative  / Bili: Negative / Urobili: 0.2 mg/dL   Blood: x / Protein: >=300 mg/dL / Nitrite: Negative   Leuk Esterase: Negative / RBC: 6-10 /HPF / WBC x   Sq Epi: x / Non Sq Epi: Occasional /HPF / Bacteria: Few /HPF  --------------------------------------------------------------------------------------  IMAGING RESULTS  < from: CT Lower Extremity w/ IV Cont, Left (19 @ 23:41) >  FINDINGS:  Left hip joint effusion.  No acute displaced fracture or dislocation. No soft tissue hematoma or   fluid collection. Visualized musculature is unremarkable. No evidence for   vascular injury.  IMPRESSION:  Left hip joint effusion.  < end of copied text >  --------------------------------------------------------------------------------------- Consultation Note  =====================================================    HPI:   24y Female      PAST MEDICAL & SURGICAL HISTORY:  Hypertension  Lupus (systemic lupus erythematosus)    Home Meds: Home Medications:  aspirin: 81  orally once a day (29 Aug 2018 06:52)  CellCept 500 mg oral tablet: 1500  orally once a day (29 Aug 2018 06:52)  labetalol: 400 milligram(s) orally 2 times a day (29 Aug 2018 06:52)  losartan: 50  orally once a day (29 Aug 2018 06:)  Plaquenil: 200  orally 2 times a day (29 Aug 2018 06:)  predniSONE 20 mg oral tablet: 1 tab(s) orally once a day (29 Aug 2018 06:)  tacrolimus: 2  orally 2 times a day (29 Aug 2018 06:)    Allergies: Allergies  No Known Allergies  Intolerances    Soc:   Denies Tobacco/EtOH/Substance use  Advanced Directives: Presumed Full Code     ROS:    REVIEW OF SYSTEMS    [ x ] A ten-point review of systems was otherwise negative except as noted.  [ ] Due to altered mental status/intubation, subjective information were not able to be obtained from the patient. History was obtained, to the extent possible, from review of the chart and collateral sources of information.    VITAL SIGNS, INS/OUTS (last 24 hours):  --------------------------------------------------------------------------------------  ICU Vital Signs Last 24 Hrs  T(C): 37.3 (2019 23:45), Max: 39.2 (2019 18:41)  T(F): 99.2 (2019 23:45), Max: 102.5 (2019 18:41)  HR: 103 (2019 23:45) (103 - 107)  BP: 121/69 (2019 23:45) (121/69 - 135/82)  RR: 18 (2019 23:45) (17 - 18)  SpO2: 98% (2019 23:45) (98% - 99%)    I&O's Summary  2019 07:01  -  2019 03:30  --------------------------------------------------------  IN: 2000 mL / OUT: 0 mL / NET: 2000 mL  --------------------------------------------------------------------------------------  PHYSICAL EXAM  General: NAD, AAOx3, calm and cooperative  HEENT: NCAT, Neck supple  Cardiac: RRR S1, S2  Respiratory: CTAB, normal respiratory effort  Abdomen: Soft, non-distended, non-tender  Musculoskeletal: Strength 5/5 BL UE/LE, ROM intact, compartments soft, +tenderness R medial thigh, +pain with hip flexion and external rotation  Skin: Warm/dry, normal color, no rash    LABS  --------------------------------------------------------------------------------------  Labs:  CAPILLARY BLOOD GLUCOSE                              11.8   11.26 )-----------( 328      ( 2019 20:05 )             34.6       Auto Neutrophil %: 69.1 % (19 @ 20:05)  Auto Immature Granulocyte %: 0.5 % (19 @ 20:05)        140  |  104  |  11  ----------------------------<  92  4.2   |  24  |  0.9      Calcium, Total Serum: 8.4 mg/dL (19 @ 20:05)      LFTs:             4.9  | 0.4  | 13       ------------------[50      ( 2019 20:05 )  3.1  | x    | 10          Lipase:x      Amylase:x         Lactate, Blood: 1.1 mmol/L (19 @ 20:30)    Coags: x    Urinalysis Basic - ( 2019 20:30 )  Color: Yellow / Appearance: Clear / S.015 / pH: x  Gluc: x / Ketone: Negative  / Bili: Negative / Urobili: 0.2 mg/dL   Blood: x / Protein: >=300 mg/dL / Nitrite: Negative   Leuk Esterase: Negative / RBC: 6-10 /HPF / WBC x   Sq Epi: x / Non Sq Epi: Occasional /HPF / Bacteria: Few /HPF  --------------------------------------------------------------------------------------  IMAGING RESULTS  < from: CT Lower Extremity w/ IV Cont, Left (19 @ 23:41) >  FINDINGS:  Left hip joint effusion.  No acute displaced fracture or dislocation. No soft tissue hematoma or   fluid collection. Visualized musculature is unremarkable. No evidence for   vascular injury.  IMPRESSION:  Left hip joint effusion.  < end of copied text >  --------------------------------------------------------------------------------------- Consultation Note  =====================================================    HPI:   24y Female PMHx Lupus on prednisone, plaquenil, tacrolimus presented to ED c/o left thigh pain. Pain started yesterday with difficulty ambulating. Pain located around the lateral aspect of the left thigh proximally and radiates distally to the distal femur. Denies fevers or chills however ED reported a measure of 102.5 upon arrival. CT scan reads left hip effusion. Ortho is consulted for evaluation.       PAST MEDICAL & SURGICAL HISTORY:  Hypertension  Lupus (systemic lupus erythematosus)    Home Meds: Home Medications:  aspirin: 81  orally once a day (29 Aug 2018 06:52)  CellCept 500 mg oral tablet: 1500  orally once a day (29 Aug 2018 06:52)  labetalol: 400 milligram(s) orally 2 times a day (29 Aug 2018 06:52)  losartan: 50  orally once a day (29 Aug 2018 06:52)  Plaquenil: 200  orally 2 times a day (29 Aug 2018 06:52)  predniSONE 20 mg oral tablet: 1 tab(s) orally once a day (29 Aug 2018 06:52)  tacrolimus: 2  orally 2 times a day (29 Aug 2018 06:52)    Allergies: Allergies  No Known Allergies  Intolerances    Soc:   Denies Tobacco/EtOH/Substance use  Advanced Directives: Presumed Full Code     ROS:    REVIEW OF SYSTEMS    [ x ] A ten-point review of systems was otherwise negative except as noted.  [ ] Due to altered mental status/intubation, subjective information were not able to be obtained from the patient. History was obtained, to the extent possible, from review of the chart and collateral sources of information.    VITAL SIGNS, INS/OUTS (last 24 hours):  --------------------------------------------------------------------------------------  ICU Vital Signs Last 24 Hrs  T(C): 37.3 (2019 23:45), Max: 39.2 (2019 18:41)  T(F): 99.2 (2019 23:45), Max: 102.5 (2019 18:41)  HR: 103 (2019 23:45) (103 - 107)  BP: 121/69 (2019 23:45) (121/69 - 135/82)  RR: 18 (2019 23:45) (17 - 18)  SpO2: 98% (2019 23:45) (98% - 99%)    I&O's Summary  2019 07:01  -  2019 03:30  --------------------------------------------------------  IN: 2000 mL / OUT: 0 mL / NET: 2000 mL  --------------------------------------------------------------------------------------  PHYSICAL EXAM  General: NAD, AAOx3, calm and cooperative  HEENT: NCAT, Neck supple  Cardiac: RRR S1, S2  Respiratory: CTAB, normal respiratory effort  Abdomen: Soft, non-distended, non-tender  Musculoskeletal: Strength 5/5 BL UE/LE, ROM intact, compartments soft, +tenderness R medial thigh, +pain with hip flexion and external rotation  Skin: Warm/dry, normal color, no rash    LABS  --------------------------------------------------------------------------------------  Labs:  CAPILLARY BLOOD GLUCOSE                              11.8   11.26 )-----------( 328      ( 2019 20:05 )             34.6       Auto Neutrophil %: 69.1 % (19 @ 20:05)  Auto Immature Granulocyte %: 0.5 % (19 @ 20:05)        140  |  104  |  11  ----------------------------<  92  4.2   |  24  |  0.9      Calcium, Total Serum: 8.4 mg/dL (19 @ 20:05)      LFTs:             4.9  | 0.4  | 13       ------------------[50      ( 2019 20:05 )  3.1  | x    | 10          Lipase:x      Amylase:x         Lactate, Blood: 1.1 mmol/L (19 @ 20:30)    Coags: x    Urinalysis Basic - ( 2019 20:30 )  Color: Yellow / Appearance: Clear / S.015 / pH: x  Gluc: x / Ketone: Negative  / Bili: Negative / Urobili: 0.2 mg/dL   Blood: x / Protein: >=300 mg/dL / Nitrite: Negative   Leuk Esterase: Negative / RBC: 6-10 /HPF / WBC x   Sq Epi: x / Non Sq Epi: Occasional /HPF / Bacteria: Few /HPF  --------------------------------------------------------------------------------------  IMAGING RESULTS  < from: CT Lower Extremity w/ IV Cont, Left (19 @ 23:41) >  FINDINGS:  Left hip joint effusion.  No acute displaced fracture or dislocation. No soft tissue hematoma or   fluid collection. Visualized musculature is unremarkable. No evidence for   vascular injury.  IMPRESSION:  Left hip joint effusion.  < end of copied text >  --------------------------------------------------------------------------------------- Consultation Note  =====================================================    HPI:   24y Female PMHx Lupus presented to ED c/o left thigh pain, she is on prednisone, plaquenil, tacrolimus for her lupus. Per pt. her pain started yesterday w/ difficulty ambulating. Pain located around the lateral aspect of the left thigh proximally and radiates distally to the distal femur. Denies fevers , denies chills however ED reported a measure of 102.5 upon arrival. CT scan shows left hip effusion.    PAST MEDICAL & SURGICAL HISTORY:  Hypertension  Lupus (systemic lupus erythematosus)    Home Meds: Home Medications:  aspirin: 81  orally once a day (29 Aug 2018 06:52)  CellCept 500 mg oral tablet: 1500  orally once a day (29 Aug 2018 06:52)  labetalol: 400 milligram(s) orally 2 times a day (29 Aug 2018 06:52)  losartan: 50  orally once a day (29 Aug 2018 06:52)  Plaquenil: 200  orally 2 times a day (29 Aug 2018 06:52)  predniSONE 20 mg oral tablet: 1 tab(s) orally once a day (29 Aug 2018 06:52)  tacrolimus: 2  orally 2 times a day (29 Aug 2018 06:52)    Allergies: Allergies  No Known Allergies  Intolerances    Soc:   Denies Tobacco/EtOH/Substance use  Advanced Directives: Presumed Full Code     ROS:    REVIEW OF SYSTEMS    [ x ] A ten-point review of systems was otherwise negative except as noted.  [ ] Due to altered mental status/intubation, subjective information were not able to be obtained from the patient. History was obtained, to the extent possible, from review of the chart and collateral sources of information.    VITAL SIGNS, INS/OUTS (last 24 hours):  --------------------------------------------------------------------------------------  ICU Vital Signs Last 24 Hrs  T(C): 37.3 (2019 23:45), Max: 39.2 (2019 18:41)  T(F): 99.2 (2019 23:45), Max: 102.5 (2019 18:41)  HR: 103 (2019 23:45) (103 - 107)  BP: 121/69 (2019 23:45) (121/69 - 135/82)  RR: 18 (2019 23:45) (17 - 18)  SpO2: 98% (2019 23:45) (98% - 99%)    I&O's Summary  2019 07:01  -  2019 03:30  --------------------------------------------------------  IN: 2000 mL / OUT: 0 mL / NET: 2000 mL  --------------------------------------------------------------------------------------  PHYSICAL EXAM  General: NAD, AAOx3, calm and cooperative  HEENT: NCAT, Neck supple  Cardiac: RRR S1, S2  Respiratory: CTAB, normal respiratory effort  Abdomen: Soft, non-distended, non-tender  Musculoskeletal: Strength 5/5 BL UE/LE, ROM intact, compartments soft, +tenderness R medial thigh, +pain with hip flexion and external rotation  Skin: Warm/dry, normal color, no rash    LABS  --------------------------------------------------------------------------------------  Labs:  CAPILLARY BLOOD GLUCOSE                              11.8   11.26 )-----------( 328      ( 2019 20:05 )             34.6       Auto Neutrophil %: 69.1 % (19 @ 20:05)  Auto Immature Granulocyte %: 0.5 % (19 @ 20:05)        140  |  104  |  11  ----------------------------<  92  4.2   |  24  |  0.9      Calcium, Total Serum: 8.4 mg/dL (19 @ 20:05)      LFTs:             4.9  | 0.4  | 13       ------------------[50      ( 2019 20:05 )  3.1  | x    | 10          Lipase:x      Amylase:x         Lactate, Blood: 1.1 mmol/L (19 @ 20:30)    Coags: x    Urinalysis Basic - ( 2019 20:30 )  Color: Yellow / Appearance: Clear / S.015 / pH: x  Gluc: x / Ketone: Negative  / Bili: Negative / Urobili: 0.2 mg/dL   Blood: x / Protein: >=300 mg/dL / Nitrite: Negative   Leuk Esterase: Negative / RBC: 6-10 /HPF / WBC x   Sq Epi: x / Non Sq Epi: Occasional /HPF / Bacteria: Few /HPF  --------------------------------------------------------------------------------------  IMAGING RESULTS  < from: CT Lower Extremity w/ IV Cont, Left (19 @ 23:41) >  FINDINGS:  Left hip joint effusion.  No acute displaced fracture or dislocation. No soft tissue hematoma or   fluid collection. Visualized musculature is unremarkable. No evidence for   vascular injury.  IMPRESSION:  Left hip joint effusion.  < end of copied text >  ---------------------------------------------------------------------------------------

## 2019-07-03 NOTE — CONSULT NOTE ADULT - ASSESSMENT
ASSESSMENT:  24y Female w/ SLE immunosuppressed, presents with few days worsening L proximal thigh pain and pain with ambulation, fever of 102.5, mild leukocytosis, concerning for possible joint space infection versus soft tissue infection.     PLAN:   CT BL LE with no evidence of soft tissue necrotizing infection  Blood Cx  Orthopedics consultation  No acute general surgery intervention indicated  Continue with care as per ortho, recall if patient worsens clinically, continued fever and up-trending WBC with evidence of soft tissue involvement     Above plan discussed with Dr. Pritchard , patient, and ED team  --------------------------------------------------------------------------------------    07-03-19 @ 03:30

## 2019-07-03 NOTE — H&P ADULT - NSHPLABSRESULTS_GEN_ALL_CORE
CBC Full  -  ( 02 Jul 2019 20:05 )  WBC Count : 11.26 K/uL  RBC Count : 4.32 M/uL  Hemoglobin : 11.8 g/dL  Hematocrit : 34.6 %  Platelet Count - Automated : 328 K/uL  Mean Cell Volume : 80.1 fL  Mean Cell Hemoglobin : 27.3 pg  Mean Cell Hemoglobin Concentration : 34.1 g/dL  Auto Neutrophil # : 7.78 K/uL  Auto Lymphocyte # : 1.70 K/uL  Auto Monocyte # : 1.68 K/uL  Auto Eosinophil # : 0.02 K/uL  Auto Basophil # : 0.02 K/uL  Auto Neutrophil % : 69.1 %  Auto Lymphocyte % : 15.1 %  Auto Monocyte % : 14.9 %  Auto Eosinophil % : 0.2 %  Auto Basophil % : 0.2 %    07-02    140  |  104  |  11  ----------------------------<  92  4.2   |  24  |  0.9    Ca    8.4<L>      02 Jul 2019 20:05    TPro  4.9<L>  /  Alb  3.1<L>  /  TBili  0.4  /  DBili  x   /  AST  13  /  ALT  10  /  AlkPhos  50  07-02    Sedimentation Rate, Erythrocyte: 50 mm/Hr (07.02.19 @ 20:05)    < from: CT Lower Extremity w/ IV Cont, Left (07.02.19 @ 23:41) >    Left hip joint effusion.    No acute displaced fracture or dislocation. No soft tissue hematoma or   fluid collection. Visualized musculature is unremarkable. No evidence for   vascular injury.    IMPRESSION:    Left hip joint effusion.    < from: CT Abdomen and Pelvis w/ IV Cont (07.02.19 @ 23:41) >    LOWER CHEST: Unremarkable.  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   SPLEEN: Unremarkable.  PANCREAS: Unremarkable.  ADRENAL GLANDS: Unremarkable.  KIDNEYS: Unremarkable.  ABDOMINOPELVIC NODES: Unremarkable.  PELVIC ORGANS: IUD noted.  PERITONEUM/MESENTERY/BOWEL: Small free pelvic fluid; likely physiologic.   No bowel obstruction.   BONES/SOFT TISSUES: Unremarkable.  OTHER: Please see separate report CT lower extremities for evaluation of   lower extremities.    IMPRESSION:   1. No evidence of acute intra-abdominal pathology.  2. 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   Outpatient MRCP recommended.

## 2019-07-03 NOTE — PROGRESS NOTE ADULT - SUBJECTIVE AND OBJECTIVE BOX
ИВАН HSIEH 24y Female  MRN#: [id number]   CODE STATUS:________      SUBJECTIVE  Patient is a 24y old Female who presents with a chief complaint of LLE pain (2019 03:53)  Currently admitted to medicine with the primary diagnosis of Myositis  Hospital course has been complicated by _______.   Today is hospital day , and this morning she is _________ and reports ________ overnight events.     Present Today:           Dobbins Catheter ()No/ ()Yes? Indication:          Central Line ()No/ ()Yes? Indication:          IV Fluids ()No/ ()Yes? Type:  Rate:  Indication:      OBJECTIVE  PAST MEDICAL & SURGICAL HISTORY  IUD (intrauterine device) in place  Hypertension  Lupus (systemic lupus erythematosus)  No significant past surgical history    ALLERGIES:  No Known Drug Allergies  scallops (Unknown)    MEDICATIONS:  STANDING MEDICATIONS  aspirin  chewable 81 milliGRAM(s) Oral daily  chlorhexidine 4% Liquid 1 Application(s) Topical <User Schedule>  docusate sodium 100 milliGRAM(s) Oral three times a day  enoxaparin Injectable 40 milliGRAM(s) SubCutaneous daily  hydroxychloroquine 200 milliGRAM(s) Oral two times a day  labetalol 400 milliGRAM(s) Oral two times a day  losartan 50 milliGRAM(s) Oral daily  methylPREDNISolone sodium succinate Injectable 60 milliGRAM(s) IV Push every 24 hours  mycophenolate mofetil 1500 milliGRAM(s) Oral daily  pantoprazole    Tablet 40 milliGRAM(s) Oral before breakfast    PRN MEDICATIONS  acetaminophen   Tablet .. 650 milliGRAM(s) Oral every 4 hours PRN  acetaminophen   Tablet .. 650 milliGRAM(s) Oral every 6 hours PRN  oxyCODONE    5 mG/acetaminophen 325 mG 1 Tablet(s) Oral every 6 hours PRN  senna 2 Tablet(s) Oral at bedtime PRN  traMADol 50 milliGRAM(s) Oral every 6 hours PRN      VITAL SIGNS: Last 24 Hours  T(C): 36.4 (2019 06:32), Max: 39.2 (2019 18:41)  T(F): 97.6 (2019 06:32), Max: 102.5 (2019 18:41)  HR: 81 (2019 06:32) (81 - 107)  BP: 137/90 (2019 06:32) (121/69 - 137/90)  BP(mean): --  RR: 18 (2019 06:32) (17 - 18)  SpO2: 98% (2019 03:32) (98% - 99%)    LABS:                        11.8   11.26 )-----------( 328      ( 2019 20:05 )             34.6     07-    140  |  104  |  11  ----------------------------<  92  4.2   |  24  |  0.9    Ca    8.4<L>      2019 20:05    TPro  4.9<L>  /  Alb  3.1<L>  /  TBili  0.4  /  DBili  x   /  AST  13  /  ALT  10  /  AlkPhos  50  07-02      Urinalysis Basic - ( 2019 20:30 )    Color: Yellow / Appearance: Clear / S.015 / pH: x  Gluc: x / Ketone: Negative  / Bili: Negative / Urobili: 0.2 mg/dL   Blood: x / Protein: >=300 mg/dL / Nitrite: Negative   Leuk Esterase: Negative / RBC: 6-10 /HPF / WBC x   Sq Epi: x / Non Sq Epi: Occasional /HPF / Bacteria: Few /HPF        Lactate, Blood: 1.1 mmol/L (19 @ 20:30)  Sedimentation Rate, Erythrocyte: 50 mm/Hr <H> (19 @ 20:05)          RADIOLOGY:      PHYSICAL EXAM:    GENERAL: NAD, well-developed, AAOx3  HEENT:  Atraumatic, Normocephalic. EOMI, PERRLA, conjunctiva and sclera clear, No JVD  PULMONARY: Clear to auscultation bilaterally; No wheeze  CARDIOVASCULAR: Regular rate and rhythm; No murmurs, rubs, or gallops  GASTROINTESTINAL: Soft, Nontender, Nondistended; Bowel sounds present  MUSCULOSKELETAL:  2+ Peripheral Pulses, No clubbing, cyanosis, or edema  NEUROLOGY: non-focal  SKIN: No rashes or lesions      ADMISSION SUMMARY  Patient is a 24y old Female who presents with a chief complaint of LLE pain of 1 day duration   Currently admitted to medicine with the primary diagnosis of possible Myositis vs septic arthritis.  Patient was seen by orthopedics team yesterday and they recommended an MRI of left leg and hip.         ASSESSMENT & PLAN    Young female with SLE, presenting for left thigh pain of 1 day duration with subjective chills. She was admitted as a case of possible myositis, however in the setting of chills, we have to r/o septic arthritis       2.    3. IUD (intrauterine device) in place  Hypertension  Lupus (systemic lupus erythematosus)        Present today:  ( ) Congestive Heart Failure, Yes? ( )Acute / ( )Acute on Chronic / ( )Chronic  :  ( )Systolic / ( )Diastolic               Plan:  ( ) Complicated Pneumonia, Type?  ( )Parapneumonic effusion / ( )Abscess / ( ) Multilobar / ( )Other               Plan:  ( ) Morbid Obesity, Yes? BMI:               Plan:  ( ) Functional Quadriplegia               Plan:  ( ) Encephalopathy               Plan:    ( ) Discussion with patient and/or family regarding goals of care  ( ) Discussed Case and Plan with Medical Attending, Name: ИВАН HSIEH 24y Female  MRN#: [id number]     SUBJECTIVE  Patient is a 24y old Female who presents with a chief complaint of LLE pain (2019 03:53)  Currently admitted to medicine with the primary diagnosis of Myositis     OBJECTIVE  PAST MEDICAL & SURGICAL HISTORY  IUD (intrauterine device) in place  Hypertension  Lupus (systemic lupus erythematosus)  No significant past surgical history    ALLERGIES:  No Known Drug Allergies  scallops (Unknown)    MEDICATIONS:  STANDING MEDICATIONS  aspirin  chewable 81 milliGRAM(s) Oral daily  chlorhexidine 4% Liquid 1 Application(s) Topical <User Schedule>  docusate sodium 100 milliGRAM(s) Oral three times a day  enoxaparin Injectable 40 milliGRAM(s) SubCutaneous daily  hydroxychloroquine 200 milliGRAM(s) Oral two times a day  labetalol 400 milliGRAM(s) Oral two times a day  losartan 50 milliGRAM(s) Oral daily  methylPREDNISolone sodium succinate Injectable 60 milliGRAM(s) IV Push every 24 hours  mycophenolate mofetil 1500 milliGRAM(s) Oral daily  pantoprazole    Tablet 40 milliGRAM(s) Oral before breakfast    PRN MEDICATIONS  acetaminophen   Tablet .. 650 milliGRAM(s) Oral every 4 hours PRN  acetaminophen   Tablet .. 650 milliGRAM(s) Oral every 6 hours PRN  oxyCODONE    5 mG/acetaminophen 325 mG 1 Tablet(s) Oral every 6 hours PRN  senna 2 Tablet(s) Oral at bedtime PRN  traMADol 50 milliGRAM(s) Oral every 6 hours PRN      VITAL SIGNS: Last 24 Hours  T(C): 36.4 (2019 06:32), Max: 39.2 (2019 18:41)  T(F): 97.6 (2019 06:32), Max: 102.5 (2019 18:41)  HR: 81 (2019 06:32) (81 - 107)  BP: 137/90 (2019 06:32) (121/69 - 137/90)  BP(mean): --  RR: 18 (2019 06:32) (17 - 18)  SpO2: 98% (2019 03:32) (98% - 99%)    LABS:                        11.8   11.26 )-----------( 328      ( 2019 20:05 )             34.6     07-02    140  |  104  |  11  ----------------------------<  92  4.2   |  24  |  0.9    Ca    8.4<L>         TPro  4.9<L>  /  Alb  3.1<L>  /  TBili  0.4  /  DBili  x   /  AST  13  /  ALT  10  /  AlkPhos  50      Urinalysis Basic - ( 2019 20:30 )    Color: Yellow / Appearance: Clear / S.015 / pH: x  Gluc: x / Ketone: Negative  / Bili: Negative / Urobili: 0.2 mg/dL   Blood: x / Protein: >=300 mg/dL / Nitrite: Negative   Leuk Esterase: Negative / RBC: 6-10 /HPF / WBC x   Sq Epi: x / Non Sq Epi: Occasional /HPF / Bacteria: Few /HPF    Lactate, Blood: 1.1 mmol/L (19 @ 20:30)  Sedimentation Rate, Erythrocyte: 50 mm/Hr <H> (19 @ 20:05)    RADIOLOGY:      PHYSICAL EXAM:    GENERAL: NAD, well-developed, AAOx3  HEENT:  Atraumatic, Normocephalic. EOMI, PERRLA, conjunctiva and sclera clear, No JVD  PULMONARY: Clear to auscultation bilaterally; No wheeze  CARDIOVASCULAR: Regular rate and rhythm; No murmurs, rubs, or gallops  GASTROINTESTINAL: Soft, Nontender, Nondistended; Bowel sounds present  MUSCULOSKELETAL:  2+ Peripheral Pulses, No clubbing, cyanosis, or edema  NEUROLOGY: non-focal  SKIN: No rashes or lesions      ADMISSION SUMMARY  Patient is a 24y old Female who presents with a chief complaint of LLE pain of 1 day duration   Currently admitted to medicine with the primary diagnosis of possible Myositis vs septic arthritis.  Patient was seen by orthopedics team yesterday and they recommended an MRI of left leg and hip.         ASSESSMENT & PLAN    Young female with SLE, presenting for left thigh pain of 1 day duration with subjective chills. She was admitted as a case of possible myositis, however in the setting of chills, we have to r/o septic arthritis       2.    3. IUD (intrauterine device) in place  Hypertension  Lupus (systemic lupus erythematosus)        Present today:  ( ) Congestive Heart Failure, Yes? ( )Acute / ( )Acute on Chronic / ( )Chronic  :  ( )Systolic / ( )Diastolic               Plan:  ( ) Complicated Pneumonia, Type?  ( )Parapneumonic effusion / ( )Abscess / ( ) Multilobar / ( )Other               Plan:  ( ) Morbid Obesity, Yes? BMI:               Plan:  ( ) Functional Quadriplegia               Plan:  ( ) Encephalopathy               Plan:    ( ) Discussion with patient and/or family regarding goals of care  ( ) Discussed Case and Plan with Medical Attending, Name: ИВАН HSIEH 24y Female  MRN#: [id number]     SUBJECTIVE  Patient is a 24y old Female who presents with a chief complaint of LLE pain (2019 03:53)  Currently admitted to medicine with the primary diagnosis of Myositis.    Currently patient says the her pain in minimal. She is able to walk and has no complaints.     OBJECTIVE  PAST MEDICAL & SURGICAL HISTORY  IUD (intrauterine device) in place  Hypertension  Lupus (systemic lupus erythematosus)  No significant past surgical history    ALLERGIES:  No Known Drug Allergies  scallops (Unknown)    MEDICATIONS:  STANDING MEDICATIONS  aspirin  chewable 81 milliGRAM(s) Oral daily  chlorhexidine 4% Liquid 1 Application(s) Topical <User Schedule>  docusate sodium 100 milliGRAM(s) Oral three times a day  enoxaparin Injectable 40 milliGRAM(s) SubCutaneous daily  hydroxychloroquine 200 milliGRAM(s) Oral two times a day  labetalol 400 milliGRAM(s) Oral two times a day  losartan 50 milliGRAM(s) Oral daily  methylPREDNISolone sodium succinate Injectable 60 milliGRAM(s) IV Push every 24 hours  mycophenolate mofetil 1500 milliGRAM(s) Oral daily  pantoprazole    Tablet 40 milliGRAM(s) Oral before breakfast    PRN MEDICATIONS  acetaminophen   Tablet .. 650 milliGRAM(s) Oral every 4 hours PRN  acetaminophen   Tablet .. 650 milliGRAM(s) Oral every 6 hours PRN  oxyCODONE    5 mG/acetaminophen 325 mG 1 Tablet(s) Oral every 6 hours PRN  senna 2 Tablet(s) Oral at bedtime PRN  traMADol 50 milliGRAM(s) Oral every 6 hours PRN      VITAL SIGNS: Last 24 Hours  T(C): 36.4 (2019 06:32), Max: 39.2 (2019 18:41)  T(F): 97.6 (2019 06:32), Max: 102.5 (2019 18:41)  HR: 81 (2019 06:32) (81 - 107)  BP: 137/90 (2019 06:32) (121/69 - 137/90)  BP(mean): --  RR: 18 (2019 06:32) (17 - 18)  SpO2: 98% (2019 03:32) (98% - 99%)    LABS:                        11.8   11.26 )-----------( 328      ( 2019 20:05 )             34.6     07-    140  |  104  |  11  ----------------------------<  92  4.2   |  24  |  0.9    Ca    8.4<L>         TPro  4.9<L>  /  Alb  3.1<L>  /  TBili  0.4  /  DBili  x   /  AST  13  /  ALT  10  /  AlkPhos  50      Urinalysis Basic - ( 2019 20:30 )    Color: Yellow / Appearance: Clear / S.015 / pH: x  Gluc: x / Ketone: Negative  / Bili: Negative / Urobili: 0.2 mg/dL   Blood: x / Protein: >=300 mg/dL / Nitrite: Negative   Leuk Esterase: Negative / RBC: 6-10 /HPF / WBC x   Sq Epi: x / Non Sq Epi: Occasional /HPF / Bacteria: Few /HPF    Lactate, Blood: 1.1 mmol/L (19 @ 20:30)  Sedimentation Rate, Erythrocyte: 50 mm/Hr <H> (19 @ 20:05)    RADIOLOGY:  < from: CT Lower Extremity w/ IV Cont, Left (19 @ 23:41) >  IMPRESSION:    Small left hip and left knee joint effusion.    No acute osseous abnormality. No soft tissue hematoma or drainable fluid   collection or inflammation.    Apparent mild generalized enlargement of the left thigh compared to the   right, nonspecific.    < end of copied text >      PHYSICAL EXAM:    GENERAL: NAD, well-developed, AAOx3  HEENT:  Atraumatic, Normocephalic. EOMI, PERRLA, conjunctiva and sclera clear, No JVD  PULMONARY: Clear to auscultation bilaterally; No wheeze  CARDIOVASCULAR: Regular rate and rhythm; No murmurs, rubs, or gallops  GASTROINTESTINAL: Soft, Nontender, Nondistended; Bowel sounds present  MUSCULOSKELETAL:  2+ Peripheral Pulses, No clubbing, cyanosis, or edema  Extremities: Left knee is not tender to palpation, no swelling or erythema and full range of motion.    ADMISSION SUMMARY  Patient is a 24y old Female who presents with a chief complaint of LLE pain of 1 day duration   Currently admitted to medicine with the primary diagnosis of possible Myositis vs septic arthritis.  Patient was seen by orthopedics team yesterday and they recommended an MRI of left leg and hip.     ASSESSMENT & PLAN    Young female with SLE, presenting for left thigh pain of 1 day duration with subjective chills. She was admitted as a case of possible myositis, however in the setting of chills, we have to r/o septic arthritis     (1) ortho  - we will continue steroids to treat a possible myositis for a possible SLE flare  - will do MRI of left lower extremity and hip to look for possible septic arthritis. Copper IUD is compatible with MRI  - no need for antibiotics right now  - monitor for fevers and if febrile, inform ortho and consider starting antibiotics     (2) HTN  - continue losartan and labetolol for her SLE induced hypertension ИВАН HSIEH 24y Female  MRN#: [id number]     SUBJECTIVE  Patient is a 24y old Female who presents with a chief complaint of LLE pain (2019 03:53)  Currently admitted to medicine with the primary diagnosis of Myositis.    Currently patient says the her pain in minimal. She is able to walk and has no complaints.     OBJECTIVE  PAST MEDICAL & SURGICAL HISTORY  IUD (intrauterine device) in place  Hypertension  Lupus (systemic lupus erythematosus)  No significant past surgical history    ALLERGIES:  No Known Drug Allergies  scallops (Unknown)    MEDICATIONS:  STANDING MEDICATIONS  aspirin  chewable 81 milliGRAM(s) Oral daily  chlorhexidine 4% Liquid 1 Application(s) Topical <User Schedule>  docusate sodium 100 milliGRAM(s) Oral three times a day  enoxaparin Injectable 40 milliGRAM(s) SubCutaneous daily  hydroxychloroquine 200 milliGRAM(s) Oral two times a day  labetalol 400 milliGRAM(s) Oral two times a day  losartan 50 milliGRAM(s) Oral daily  methylPREDNISolone sodium succinate Injectable 60 milliGRAM(s) IV Push every 24 hours  mycophenolate mofetil 1500 milliGRAM(s) Oral daily  pantoprazole    Tablet 40 milliGRAM(s) Oral before breakfast    PRN MEDICATIONS  acetaminophen   Tablet .. 650 milliGRAM(s) Oral every 4 hours PRN  acetaminophen   Tablet .. 650 milliGRAM(s) Oral every 6 hours PRN  oxyCODONE    5 mG/acetaminophen 325 mG 1 Tablet(s) Oral every 6 hours PRN  senna 2 Tablet(s) Oral at bedtime PRN  traMADol 50 milliGRAM(s) Oral every 6 hours PRN      VITAL SIGNS: Last 24 Hours  T(C): 36.4 (2019 06:32), Max: 39.2 (2019 18:41)  T(F): 97.6 (2019 06:32), Max: 102.5 (2019 18:41)  HR: 81 (2019 06:32) (81 - 107)  BP: 137/90 (2019 06:32) (121/69 - 137/90)  BP(mean): --  RR: 18 (2019 06:32) (17 - 18)  SpO2: 98% (2019 03:32) (98% - 99%)    LABS:                        11.8   11.26 )-----------( 328      ( 2019 20:05 )             34.6     07-    140  |  104  |  11  ----------------------------<  92  4.2   |  24  |  0.9    Ca    8.4<L>         TPro  4.9<L>  /  Alb  3.1<L>  /  TBili  0.4  /  DBili  x   /  AST  13  /  ALT  10  /  AlkPhos  50      Urinalysis Basic - ( 2019 20:30 )    Color: Yellow / Appearance: Clear / S.015 / pH: x  Gluc: x / Ketone: Negative  / Bili: Negative / Urobili: 0.2 mg/dL   Blood: x / Protein: >=300 mg/dL / Nitrite: Negative   Leuk Esterase: Negative / RBC: 6-10 /HPF / WBC x   Sq Epi: x / Non Sq Epi: Occasional /HPF / Bacteria: Few /HPF    Lactate, Blood: 1.1 mmol/L (19 @ 20:30)  Sedimentation Rate, Erythrocyte: 50 mm/Hr <H> (19 @ 20:05)    RADIOLOGY:  < from: CT Lower Extremity w/ IV Cont, Left (19 @ 23:41) >  IMPRESSION:    Small left hip and left knee joint effusion.    No acute osseous abnormality. No soft tissue hematoma or drainable fluid   collection or inflammation.    Apparent mild generalized enlargement of the left thigh compared to the   right, nonspecific.    < end of copied text >      PHYSICAL EXAM:    GENERAL: NAD, well-developed, AAOx3  HEENT:  Atraumatic, Normocephalic. EOMI, PERRLA, conjunctiva and sclera clear, No JVD  PULMONARY: Clear to auscultation bilaterally; No wheeze  CARDIOVASCULAR: Regular rate and rhythm; No murmurs, rubs, or gallops  GASTROINTESTINAL: Soft, Nontender, Nondistended; Bowel sounds present  MUSCULOSKELETAL:  2+ Peripheral Pulses, No clubbing, cyanosis, or edema  Extremities: Left knee is not tender to palpation, no swelling or erythema and full range of motion.    ADMISSION SUMMARY  Patient is a 24y old Female who presents with a chief complaint of LLE pain of 1 day duration   Currently admitted to medicine with the primary diagnosis of possible Myositis vs septic arthritis.  Patient was seen by orthopedics team yesterday and they recommended an MRI of left leg and hip.     ASSESSMENT & PLAN    Young female with SLE, presenting for left thigh pain of 1 day duration with subjective chills. She was admitted as a case of possible myositis, however in the setting of chills, we have to r/o septic arthritis     (1) ortho LEFT LOWER EXTREMITY PAIN SECONDARY TO SUSPICION OF MYOSITIS VERSUS SEPTIC ARTHRITIS   - we will continue steroids to treat a possible myositis for a possible SLE flare  - will do MRI of left lower extremity and hip to look for possible septic arthritis. Copper IUD is compatible with MRI  - no need for antibiotics right now  - monitor for fevers and if febrile, inform ortho and consider starting antibiotics     (2) HTN  - continue losartan and labetolol for her SLE induced hypertension

## 2019-07-03 NOTE — H&P ADULT - NSICDXPASTMEDICALHX_GEN_ALL_CORE_FT
PAST MEDICAL HISTORY:  Hypertension     IUD (intrauterine device) in place     Lupus (systemic lupus erythematosus)

## 2019-07-03 NOTE — H&P ADULT - ATTENDING COMMENTS
patient seen and examined , agree with pgy 1 assesment and plan except as indicated above,   GEN Lying in no acute distress  HEENT Pupils equal and reactive to light and accommodationSupple Neck  PULM Clear to auscultation bilaterally  CV s1s2 regular rate and rhythm  GI + bowel sounds nontnender  EXT no cyanosis or edema left thigh nontender but described as uncomfortable   PSYCH awake alert and oriented x 3  INTEG No Lesions  NEURO YO  #Systemic lupus erythematous stable  in discussing with patient no family history of autoimmune diseases  #Suspicion for choledochocal cyst outpatient mri  #Left lower extremity pain secondary to myositis versus septic arthritis , ortho following , mri pending  Progress Note Handoff    Pending:  mri then further treatment dependant on results   Family discussion: patient is of sound mind and agrees to plan of care    Disposition: Home___

## 2019-07-04 LAB
HCT VFR BLD CALC: 31.9 % — LOW (ref 37–47)
HGB BLD-MCNC: 10.8 G/DL — LOW (ref 12–16)
MCHC RBC-ENTMCNC: 27 PG — SIGNIFICANT CHANGE UP (ref 27–31)
MCHC RBC-ENTMCNC: 33.9 G/DL — SIGNIFICANT CHANGE UP (ref 32–37)
MCV RBC AUTO: 79.8 FL — LOW (ref 81–99)
NRBC # BLD: 0 /100 WBCS — SIGNIFICANT CHANGE UP (ref 0–0)
PLATELET # BLD AUTO: 356 K/UL — SIGNIFICANT CHANGE UP (ref 130–400)
RBC # BLD: 4 M/UL — LOW (ref 4.2–5.4)
RBC # FLD: 11.7 % — SIGNIFICANT CHANGE UP (ref 11.5–14.5)
WBC # BLD: 12.48 K/UL — HIGH (ref 4.8–10.8)
WBC # FLD AUTO: 12.48 K/UL — HIGH (ref 4.8–10.8)

## 2019-07-04 PROCEDURE — 99232 SBSQ HOSP IP/OBS MODERATE 35: CPT

## 2019-07-04 RX ADMIN — Medication 81 MILLIGRAM(S): at 11:21

## 2019-07-04 RX ADMIN — Medication 60 MILLIGRAM(S): at 06:23

## 2019-07-04 RX ADMIN — ENOXAPARIN SODIUM 40 MILLIGRAM(S): 100 INJECTION SUBCUTANEOUS at 11:21

## 2019-07-04 RX ADMIN — Medication 200 MILLIGRAM(S): at 05:36

## 2019-07-04 RX ADMIN — Medication 400 MILLIGRAM(S): at 17:20

## 2019-07-04 RX ADMIN — Medication 200 MILLIGRAM(S): at 17:20

## 2019-07-04 RX ADMIN — PANTOPRAZOLE SODIUM 40 MILLIGRAM(S): 20 TABLET, DELAYED RELEASE ORAL at 07:33

## 2019-07-04 RX ADMIN — Medication 400 MILLIGRAM(S): at 05:36

## 2019-07-04 RX ADMIN — LOSARTAN POTASSIUM 50 MILLIGRAM(S): 100 TABLET, FILM COATED ORAL at 05:36

## 2019-07-04 RX ADMIN — MYCOPHENOLATE MOFETIL 1500 MILLIGRAM(S): 250 CAPSULE ORAL at 11:21

## 2019-07-04 NOTE — PROGRESS NOTE ADULT - SUBJECTIVE AND OBJECTIVE BOX
ИВАН HSIEH  24y  [facility] [id number] [place] [id number] [address]      Patient is a 24y old  Female who presents with a chief complaint of LLE pain (03 Jul 2019 10:07)      INTERVAL HPI/OVERNIGHT EVENTS:    no acute events overnight     REVIEW OF SYSTEMS:  CONSTITUTIONAL: No fever, weight loss, or fatigue  EYES: No eye pain, visual disturbances, or discharge  ENMT:  No difficulty hearing, tinnitus, vertigo; No sinus or throat pain  NECK: No pain or stiffness  BREASTS: No pain, masses, or nipple discharge  RESPIRATORY: No cough, wheezing, chills or hemoptysis; No shortness of breath  CARDIOVASCULAR: No chest pain, palpitations, dizziness, or leg swelling  GASTROINTESTINAL: No abdominal or epigastric pain. No nausea, vomiting, or hematemesis; No diarrhea or constipation. No melena or hematochezia.  GENITOURINARY: No dysuria, frequency, hematuria, or incontinence  NEUROLOGICAL: No headaches, memory loss, loss of strength, numbness, or tremors  SKIN: No itching, burning, rashes, or lesions   LYMPH NODES: No enlarged glands  ENDOCRINE: No heat or cold intolerance; No hair loss  MUSCULOSKELETAL: soreness noted in left thigh  PSYCHIATRIC: No depression, anxiety, mood swings, or difficulty sleeping  HEME/LYMPH: No easy bruising, or bleeding gums  ALLERY AND IMMUNOLOGIC: No hives or eczema  FAMILY HISTORY:  FH: HTN (hypertension): father    T(C): 36.2 (07-04-19 @ 12:00), Max: 36.9 (07-03-19 @ 20:18)  HR: 90 (07-04-19 @ 12:00) (90 - 100)  BP: 113/72 (07-04-19 @ 12:00) (108/67 - 132/81)  RR: 18 (07-04-19 @ 12:00) (18 - 18)  SpO2: --  Wt(kg): --Vital Signs Last 24 Hrs  T(C): 36.2 (04 Jul 2019 12:00), Max: 36.9 (03 Jul 2019 20:18)  T(F): 97.1 (04 Jul 2019 12:00), Max: 98.5 (03 Jul 2019 20:18)  HR: 90 (04 Jul 2019 12:00) (90 - 100)  BP: 113/72 (04 Jul 2019 12:00) (108/67 - 132/81)  BP(mean): --  RR: 18 (04 Jul 2019 12:00) (18 - 18)  SpO2: --    PHYSICAL EXAM:  GENERAL: NAD, well-groomed, well-developed  HEAD:  Atraumatic, Normocephalic  EYES: EOMI, PERRLA, conjunctiva and sclera clear  ENMT: No tonsillar erythema, exudates, or enlargement; Moist mucous membranes, Good dentition, No lesions  NECK: Supple, No JVD, Normal thyroid  NERVOUS SYSTEM:  Alert & Oriented X3, Good concentration; Motor Strength 5/5 B/L upper and lower extremities; DTRs 2+ intact and symmetric  PULM: Clear to auscultation bilaterally  CARDIAC: Regular rate and rhythm; No murmurs, rubs, or gallops  GI: Soft, Nontender, Nondistended; Bowel sounds present  EXTREMITIES:  2+ Peripheral Pulses, No clubbing, cyanosis, or edema, left lle full rom and no tenderness noted  LYMPH: No lymphadenopathy noted  SKIN: No rashes or lesions    Consultant(s) Notes Reviewed:  [x ] YES  [ ] NO  Care Discussed with Consultants/Other Providers [ x] YES  [ ] NO    LABS:                            10.8   12.48 )-----------( 356      ( 04 Jul 2019 08:47 )             31.9   07-03    141  |  105  |  16  ----------------------------<  124<H>  4.6   |  24  |  0.9    Ca    9.0      03 Jul 2019 11:38    TPro  4.9<L>  /  Alb  2.8<L>  /  TBili  0.3  /  DBili  x   /  AST  13  /  ALT  9   /  AlkPhos  49  07-03            Culture - Blood (collected 02 Jul 2019 20:05)  Source: .Blood Blood  Preliminary Report (04 Jul 2019 06:01):    No growth to date.    Culture - Blood (collected 02 Jul 2019 20:05)  Source: .Blood Blood  Preliminary Report (04 Jul 2019 06:01):    No growth to date.      acetaminophen   Tablet .. 650 milliGRAM(s) Oral every 4 hours PRN  acetaminophen   Tablet .. 650 milliGRAM(s) Oral every 6 hours PRN  aspirin  chewable 81 milliGRAM(s) Oral daily  chlorhexidine 4% Liquid 1 Application(s) Topical <User Schedule>  docusate sodium 100 milliGRAM(s) Oral three times a day  enoxaparin Injectable 40 milliGRAM(s) SubCutaneous daily  hydroxychloroquine 200 milliGRAM(s) Oral two times a day  labetalol 400 milliGRAM(s) Oral two times a day  losartan 50 milliGRAM(s) Oral daily  methylPREDNISolone sodium succinate Injectable 60 milliGRAM(s) IV Push every 24 hours  mycophenolate mofetil 1500 milliGRAM(s) Oral daily  oxyCODONE    5 mG/acetaminophen 325 mG 1 Tablet(s) Oral every 6 hours PRN  pantoprazole    Tablet 40 milliGRAM(s) Oral before breakfast  senna 2 Tablet(s) Oral at bedtime PRN  traMADol 50 milliGRAM(s) Oral every 6 hours PRN      HEALTH ISSUES - PROBLEM Dx:          Case Discussed with House Staff   Spectra [phone]

## 2019-07-05 VITALS
RESPIRATION RATE: 18 BRPM | SYSTOLIC BLOOD PRESSURE: 142 MMHG | TEMPERATURE: 97 F | DIASTOLIC BLOOD PRESSURE: 75 MMHG | HEART RATE: 66 BPM

## 2019-07-05 DIAGNOSIS — Z71.89 OTHER SPECIFIED COUNSELING: ICD-10-CM

## 2019-07-05 LAB — ALDOLASE SERPL-CCNC: 4.8 U/L — SIGNIFICANT CHANGE UP (ref 3.3–10.3)

## 2019-07-05 PROCEDURE — 73723 MRI JOINT LWR EXTR W/O&W/DYE: CPT | Mod: 26,LT

## 2019-07-05 PROCEDURE — 99239 HOSP IP/OBS DSCHRG MGMT >30: CPT

## 2019-07-05 RX ADMIN — PANTOPRAZOLE SODIUM 40 MILLIGRAM(S): 20 TABLET, DELAYED RELEASE ORAL at 08:13

## 2019-07-05 RX ADMIN — Medication 60 MILLIGRAM(S): at 06:41

## 2019-07-05 RX ADMIN — LOSARTAN POTASSIUM 50 MILLIGRAM(S): 100 TABLET, FILM COATED ORAL at 05:28

## 2019-07-05 RX ADMIN — Medication 200 MILLIGRAM(S): at 05:28

## 2019-07-05 RX ADMIN — Medication 400 MILLIGRAM(S): at 05:28

## 2019-07-05 RX ADMIN — Medication 81 MILLIGRAM(S): at 11:10

## 2019-07-05 RX ADMIN — MYCOPHENOLATE MOFETIL 1500 MILLIGRAM(S): 250 CAPSULE ORAL at 11:10

## 2019-07-05 NOTE — DISCHARGE NOTE PROVIDER - CARE PROVIDERS DIRECT ADDRESSES
[email or web address]

## 2019-07-05 NOTE — DISCHARGE NOTE PROVIDER - NSDCCPCAREPLAN_GEN_ALL_CORE_FT
PRINCIPAL DISCHARGE DIAGNOSIS  Diagnosis: Myositis  Assessment and Plan of Treatment: You came with pain in your left leg/hip on July 2nd 2019. You were evaulated by the orthopedics team and ther recommended MRI of the left hip. You were discharged from the hospital on July 5th 2019.  You will follow up with orthopedics as an outpatient

## 2019-07-05 NOTE — PROGRESS NOTE ADULT - SUBJECTIVE AND OBJECTIVE BOX
ИВАН HSIEH  24y  [facility] [address] [place] [address] A      Patient is a 24y old  Female who presents with a chief complaint of LLE pain (05 Jul 2019 07:31)      INTERVAL HPI/OVERNIGHT EVENTS:    no events overnight     REVIEW OF SYSTEMS:  CONSTITUTIONAL: No fever, weight loss, or fatigue  EYES: No eye pain, visual disturbances, or discharge  ENMT:  No difficulty hearing, tinnitus, vertigo; No sinus or throat pain  NECK: No pain or stiffness  BREASTS: No pain, masses, or nipple discharge  RESPIRATORY: No cough, wheezing, chills or hemoptysis; No shortness of breath  CARDIOVASCULAR: No chest pain, palpitations, dizziness, or leg swelling  GASTROINTESTINAL: No abdominal or epigastric pain. No nausea, vomiting, or hematemesis; No diarrhea or constipation. No melena or hematochezia.  GENITOURINARY: No dysuria, frequency, hematuria, or incontinence  NEUROLOGICAL: No headaches, memory loss, loss of strength, numbness, or tremors  SKIN: No itching, burning, rashes, or lesions   LYMPH NODES: No enlarged glands  ENDOCRINE: No heat or cold intolerance; No hair loss  MUSCULOSKELETAL: No joint pain or swelling; No muscle, back, or extremity pain  PSYCHIATRIC: No depression, anxiety, mood swings, or difficulty sleeping  HEME/LYMPH: No easy bruising, or bleeding gums  ALLERY AND IMMUNOLOGIC: No hives or eczema  FAMILY HISTORY:  FH: HTN (hypertension): father    T(C): 36.5 (07-05-19 @ 05:11), Max: 36.5 (07-05-19 @ 05:11)  HR: 75 (07-05-19 @ 05:11) (73 - 90)  BP: 146/73 (07-05-19 @ 05:11) (113/72 - 146/73)  RR: 18 (07-05-19 @ 05:11) (18 - 18)  SpO2: --  Wt(kg): --Vital Signs Last 24 Hrs  T(C): 36.5 (05 Jul 2019 05:11), Max: 36.5 (05 Jul 2019 05:11)  T(F): 97.7 (05 Jul 2019 05:11), Max: 97.7 (05 Jul 2019 05:11)  HR: 75 (05 Jul 2019 05:11) (73 - 90)  BP: 146/73 (05 Jul 2019 05:11) (113/72 - 146/73)  BP(mean): --  RR: 18 (05 Jul 2019 05:11) (18 - 18)  SpO2: --    PHYSICAL EXAM:  GENERAL: NAD, well-groomed, well-developed  HEAD:  Atraumatic, Normocephalic  EYES: EOMI, PERRLA, conjunctiva and sclera clear  ENMT: No tonsillar erythema, exudates, or enlargement; Moist mucous membranes, Good dentition, No lesions  NECK: Supple, No JVD, Normal thyroid  NERVOUS SYSTEM:  Alert & Oriented X3, Good concentration; Motor Strength 5/5 B/L upper and lower extremities; DTRs 2+ intact and symmetric  PULM: Clear to auscultation bilaterally  CARDIAC: Regular rate and rhythm; No murmurs, rubs, or gallops  GI: Soft, Nontender, Nondistended; Bowel sounds present  EXTREMITIES:  2+ Peripheral Pulses, No clubbing, cyanosis, or edema  LYMPH: No lymphadenopathy noted  SKIN: No rashes or lesions    Consultant(s) Notes Reviewed:  [x ] YES  [ ] NO  Care Discussed with Consultants/Other Providers [ x] YES  [ ] NO    LABS:                            10.8   12.48 )-----------( 356      ( 04 Jul 2019 08:47 )             31.9                 Culture - Blood (collected 02 Jul 2019 20:05)  Source: .Blood Blood  Preliminary Report (04 Jul 2019 06:01):    No growth to date.    Culture - Blood (collected 02 Jul 2019 20:05)  Source: .Blood Blood  Preliminary Report (04 Jul 2019 06:01):    No growth to date.      acetaminophen   Tablet .. 650 milliGRAM(s) Oral every 4 hours PRN  acetaminophen   Tablet .. 650 milliGRAM(s) Oral every 6 hours PRN  aspirin  chewable 81 milliGRAM(s) Oral daily  chlorhexidine 4% Liquid 1 Application(s) Topical <User Schedule>  docusate sodium 100 milliGRAM(s) Oral three times a day  enoxaparin Injectable 40 milliGRAM(s) SubCutaneous daily  hydroxychloroquine 200 milliGRAM(s) Oral two times a day  labetalol 400 milliGRAM(s) Oral two times a day  losartan 50 milliGRAM(s) Oral daily  methylPREDNISolone sodium succinate Injectable 60 milliGRAM(s) IV Push every 24 hours  mycophenolate mofetil 1500 milliGRAM(s) Oral daily  oxyCODONE    5 mG/acetaminophen 325 mG 1 Tablet(s) Oral every 6 hours PRN  pantoprazole    Tablet 40 milliGRAM(s) Oral before breakfast  senna 2 Tablet(s) Oral at bedtime PRN  traMADol 50 milliGRAM(s) Oral every 6 hours PRN      HEALTH ISSUES - PROBLEM Dx:    Time spent 37 min      Case Discussed with House Staff ]   Spectra [phone]

## 2019-07-05 NOTE — DISCHARGE NOTE PROVIDER - CARE PROVIDER_API CALL
Rigo Grant)  Orthopaedic Surgery Surgery  [address] [address]  Phone: [phone]  Fax: [phone]  Follow Up Time:     Katy Patrick)  Rheumatology  [address] [place] [address], [place] [address]  Phone: [phone]  Fax: [phone]  Follow Up Time:

## 2019-07-05 NOTE — DISCHARGE NOTE NURSING/CASE MANAGEMENT/SOCIAL WORK - NSDCDPATPORTLINK_GEN_ALL_CORE
You can access the [facility] Patient Portal, offered by [facility], by registering with the following website: [email or web address]

## 2019-07-05 NOTE — PROGRESS NOTE ADULT - ASSESSMENT
#LLE pain secondary to suspicion of myositis versus septic arthritis   pain resolved   appreciate ortho follow up   # HTN controlled    #SLE controlled      #Anemia no indication for transfusion at this time     DC HOME
#LLE pain secondary to suspicion of myositis versus septic arthritis   awaiting MRI    # HTN controlled    #SLE controlled      #Anemia no indication for transfusion at this time     Progress Note Handoff    Pending:  MRI   Family discussion: patient is of sound mind    Disposition: Home___

## 2019-07-05 NOTE — DISCHARGE NOTE PROVIDER - HOSPITAL COURSE
25 yo female with PMHx of SLE, HTN (secondary to Lupus) presents to ED with CC of Left LE pain. Pain started yesterday morning, as a sore feeling in the leg, but got progressively worse during the day. Pain was constant, located in the left thigh above the knee joint and little below the hip joint. Pain was constant, sharp along with come tightness in the thigh, ranging from 8-10/10 in intensity, radiating to the back and the left buttock. Pain was aggravated by increased movement and relieved partially by tylenol and vicodin (put pt to sleep but when she woke up- pain came back). Pt reports increased swelling and tenderness to touch but no weakness, numbness, tingling, erythema, or discharge. Pt denies any similar episodes in the past.    Previous hospitalization was in September 2018 for UTI/pyelonephritis.         In the ED, Pt is s/p 1 dose of Zosyn and vancomycin. Vitals were significant for fever 102.5F. CT LLE w/ IV contrast showed left hip effusion. No evidence of fracture dislocation or vascular issue. Pt was evaluated by Ortho team- Low suspicion of septic arthritis, request MRI LLE w/ IV contrast. Surgery is not planning any acute surgical intervention.         She had her MRI done and she will follow up with orthopedics as an outpatient.

## 2019-07-05 NOTE — PROGRESS NOTE ADULT - ATTENDING COMMENTS
Care per primary team  Will follow MRI
Pt. seen/ examined  Walks without pain. No clinical evidence of infection  No need for MRI as inpatient

## 2019-07-08 LAB
CULTURE RESULTS: SIGNIFICANT CHANGE UP
CULTURE RESULTS: SIGNIFICANT CHANGE UP
SPECIMEN SOURCE: SIGNIFICANT CHANGE UP
SPECIMEN SOURCE: SIGNIFICANT CHANGE UP

## 2019-07-11 DIAGNOSIS — M32.9 SYSTEMIC LUPUS ERYTHEMATOSUS, UNSPECIFIED: ICD-10-CM

## 2019-07-11 DIAGNOSIS — D64.9 ANEMIA, UNSPECIFIED: ICD-10-CM

## 2019-07-11 DIAGNOSIS — D72.829 ELEVATED WHITE BLOOD CELL COUNT, UNSPECIFIED: ICD-10-CM

## 2019-07-11 DIAGNOSIS — Z79.82 LONG TERM (CURRENT) USE OF ASPIRIN: ICD-10-CM

## 2019-07-11 DIAGNOSIS — I10 ESSENTIAL (PRIMARY) HYPERTENSION: ICD-10-CM

## 2019-07-11 DIAGNOSIS — M60.9 MYOSITIS, UNSPECIFIED: ICD-10-CM

## 2019-10-15 PROBLEM — Z00.00 ENCOUNTER FOR PREVENTIVE HEALTH EXAMINATION: Status: ACTIVE | Noted: 2019-10-15

## 2020-02-14 PROBLEM — Z97.5 PRESENCE OF (INTRAUTERINE) CONTRACEPTIVE DEVICE: Chronic | Status: ACTIVE | Noted: 2019-07-03

## 2020-02-20 ENCOUNTER — OUTPATIENT (OUTPATIENT)
Dept: OUTPATIENT SERVICES | Facility: HOSPITAL | Age: 25
LOS: 1 days | Discharge: HOME | End: 2020-02-20
Payer: COMMERCIAL

## 2020-02-20 VITALS
DIASTOLIC BLOOD PRESSURE: 95 MMHG | OXYGEN SATURATION: 98 % | SYSTOLIC BLOOD PRESSURE: 150 MMHG | HEIGHT: 59 IN | RESPIRATION RATE: 18 BRPM | HEART RATE: 83 BPM | WEIGHT: 110.23 LBS | TEMPERATURE: 98 F

## 2020-02-20 DIAGNOSIS — Z01.818 ENCOUNTER FOR OTHER PREPROCEDURAL EXAMINATION: ICD-10-CM

## 2020-02-20 DIAGNOSIS — R80.9 PROTEINURIA, UNSPECIFIED: ICD-10-CM

## 2020-02-20 DIAGNOSIS — Z98.890 OTHER SPECIFIED POSTPROCEDURAL STATES: Chronic | ICD-10-CM

## 2020-02-20 LAB
ALBUMIN SERPL ELPH-MCNC: 3 G/DL — LOW (ref 3.5–5.2)
ALP SERPL-CCNC: 52 U/L — SIGNIFICANT CHANGE UP (ref 30–115)
ALT FLD-CCNC: 7 U/L — SIGNIFICANT CHANGE UP (ref 0–41)
ANION GAP SERPL CALC-SCNC: 12 MMOL/L — SIGNIFICANT CHANGE UP (ref 7–14)
APPEARANCE UR: CLEAR — SIGNIFICANT CHANGE UP
APTT BLD: 43.3 SEC — HIGH (ref 27–39.2)
AST SERPL-CCNC: 11 U/L — SIGNIFICANT CHANGE UP (ref 0–41)
BACTERIA # UR AUTO: NEGATIVE — SIGNIFICANT CHANGE UP
BILIRUB SERPL-MCNC: <0.2 MG/DL — SIGNIFICANT CHANGE UP (ref 0.2–1.2)
BILIRUB UR-MCNC: NEGATIVE — SIGNIFICANT CHANGE UP
BUN SERPL-MCNC: 32 MG/DL — HIGH (ref 10–20)
CALCIUM SERPL-MCNC: 9.2 MG/DL — SIGNIFICANT CHANGE UP (ref 8.5–10.1)
CHLORIDE SERPL-SCNC: 103 MMOL/L — SIGNIFICANT CHANGE UP (ref 98–110)
CO2 SERPL-SCNC: 26 MMOL/L — SIGNIFICANT CHANGE UP (ref 17–32)
COLOR SPEC: SIGNIFICANT CHANGE UP
CREAT SERPL-MCNC: 1.5 MG/DL — SIGNIFICANT CHANGE UP (ref 0.7–1.5)
DIFF PNL FLD: ABNORMAL
EPI CELLS # UR: 6 /HPF — HIGH (ref 0–5)
GLUCOSE SERPL-MCNC: 88 MG/DL — SIGNIFICANT CHANGE UP (ref 70–99)
GLUCOSE UR QL: NEGATIVE — SIGNIFICANT CHANGE UP
HCT VFR BLD CALC: 33.5 % — LOW (ref 37–47)
HGB BLD-MCNC: 11 G/DL — LOW (ref 12–16)
HYALINE CASTS # UR AUTO: 2 /LPF — SIGNIFICANT CHANGE UP (ref 0–7)
INR BLD: 0.88 RATIO — SIGNIFICANT CHANGE UP (ref 0.65–1.3)
KETONES UR-MCNC: NEGATIVE — SIGNIFICANT CHANGE UP
LEUKOCYTE ESTERASE UR-ACNC: NEGATIVE — SIGNIFICANT CHANGE UP
MCHC RBC-ENTMCNC: 26.2 PG — LOW (ref 27–31)
MCHC RBC-ENTMCNC: 32.8 G/DL — SIGNIFICANT CHANGE UP (ref 32–37)
MCV RBC AUTO: 79.8 FL — LOW (ref 81–99)
NITRITE UR-MCNC: NEGATIVE — SIGNIFICANT CHANGE UP
NRBC # BLD: 0 /100 WBCS — SIGNIFICANT CHANGE UP (ref 0–0)
PH UR: 6.5 — SIGNIFICANT CHANGE UP (ref 5–8)
PLATELET # BLD AUTO: 417 K/UL — HIGH (ref 130–400)
POTASSIUM SERPL-MCNC: 5.1 MMOL/L — HIGH (ref 3.5–5)
POTASSIUM SERPL-SCNC: 5.1 MMOL/L — HIGH (ref 3.5–5)
PROT SERPL-MCNC: 5 G/DL — LOW (ref 6–8)
PROT UR-MCNC: ABNORMAL
PROTHROM AB SERPL-ACNC: 10.1 SEC — SIGNIFICANT CHANGE UP (ref 9.95–12.87)
RBC # BLD: 4.2 M/UL — SIGNIFICANT CHANGE UP (ref 4.2–5.4)
RBC # FLD: 11.9 % — SIGNIFICANT CHANGE UP (ref 11.5–14.5)
RBC CASTS # UR COMP ASSIST: 12 /HPF — HIGH (ref 0–4)
SODIUM SERPL-SCNC: 141 MMOL/L — SIGNIFICANT CHANGE UP (ref 135–146)
SP GR SPEC: 1.02 — SIGNIFICANT CHANGE UP (ref 1.01–1.02)
UROBILINOGEN FLD QL: SIGNIFICANT CHANGE UP
WBC # BLD: 11.58 K/UL — HIGH (ref 4.8–10.8)
WBC # FLD AUTO: 11.58 K/UL — HIGH (ref 4.8–10.8)
WBC UR QL: 17 /HPF — HIGH (ref 0–5)

## 2020-02-20 PROCEDURE — 93010 ELECTROCARDIOGRAM REPORT: CPT

## 2020-02-20 RX ORDER — ASPIRIN/CALCIUM CARB/MAGNESIUM 324 MG
81 TABLET ORAL
Qty: 0 | Refills: 0 | DISCHARGE

## 2020-02-20 RX ORDER — LABETALOL HCL 100 MG
400 TABLET ORAL
Qty: 0 | Refills: 0 | DISCHARGE

## 2020-02-20 RX ORDER — MYCOPHENOLATE MOFETIL 250 MG/1
1500 CAPSULE ORAL
Qty: 0 | Refills: 0 | DISCHARGE

## 2020-02-20 RX ORDER — HYDROXYCHLOROQUINE SULFATE 200 MG
200 TABLET ORAL
Qty: 0 | Refills: 0 | DISCHARGE

## 2020-02-20 RX ORDER — HYDROXYCHLOROQUINE SULFATE 200 MG
0 TABLET ORAL
Qty: 0 | Refills: 0 | DISCHARGE

## 2020-02-20 RX ORDER — LOSARTAN POTASSIUM 100 MG/1
1 TABLET, FILM COATED ORAL
Qty: 0 | Refills: 0 | DISCHARGE

## 2020-02-20 RX ORDER — LOSARTAN POTASSIUM 100 MG/1
50 TABLET, FILM COATED ORAL
Qty: 0 | Refills: 0 | DISCHARGE

## 2020-02-20 NOTE — H&P PST ADULT - REASON FOR ADMISSION
Pt denies cp palp uri cough dysuria or sob. ET 1 FOS denies SOB . PT denies any open wounds, drainage or rashes. scheduled for renal core biopsy 3/2 for dr karly terry in interventional radiology. pt advised to follow up dr wade regarding stress dose STEROIDS pre op if needed. pt has hx of SLE ON PREDNISONE AND CELLCEPT  DAILY.  PT ADVISED TO FOLLOW UP REGARDING PLAQUENIL AND CELL CEPT PREOP DR WADE. PT COMPLIANT. PT ADVISED HOLD ASPIRIN 7 DAYS PRIOR AS PER DR TERRY AS WELL. PT /95 TODAY - PT DID NOT TAKE BP MEDS YET TODAY AND STATES " I RUN HIGH WHEN I DONT TAKE IT " Pt denies cp palp uri cough dysuria or sob. ET 1 FOS denies SOB . PT denies any open wounds, drainage or rashes. scheduled for renal core biopsy 3/2 for dr karly terry in interventional radiology. pt advised to follow up dr wade regarding stress dose STEROIDS pre op if needed. pt has hx of SLE and sjogrens disease ON PREDNISONE AND CELLCEPT  DAILY.  PT ADVISED TO FOLLOW UP REGARDING PLAQUENIL AND CELL CEPT PREOP DR WADE. PT COMPLIANT. PT ADVISED HOLD ASPIRIN 7 DAYS PRIOR AS PER DR TERRY AS WELL. PT /95 TODAY - PT DID NOT TAKE BP MEDS YET TODAY AND STATES " I RUN HIGH WHEN I DONT TAKE IT "

## 2020-02-21 LAB
CULTURE RESULTS: SIGNIFICANT CHANGE UP
SPECIMEN SOURCE: SIGNIFICANT CHANGE UP

## 2020-02-24 PROBLEM — M32.9 SYSTEMIC LUPUS ERYTHEMATOSUS, UNSPECIFIED: Chronic | Status: ACTIVE | Noted: 2020-02-20

## 2020-02-24 PROBLEM — M32.14 GLOMERULAR DISEASE IN SYSTEMIC LUPUS ERYTHEMATOSUS: Chronic | Status: ACTIVE | Noted: 2020-02-20

## 2020-02-24 PROBLEM — M35.00 SJOGREN SYNDROME, UNSPECIFIED: Chronic | Status: ACTIVE | Noted: 2020-02-20

## 2020-03-02 ENCOUNTER — OUTPATIENT (OUTPATIENT)
Dept: OUTPATIENT SERVICES | Facility: HOSPITAL | Age: 25
LOS: 1 days | Discharge: HOME | End: 2020-03-02
Payer: COMMERCIAL

## 2020-03-02 ENCOUNTER — RESULT REVIEW (OUTPATIENT)
Age: 25
End: 2020-03-02

## 2020-03-02 DIAGNOSIS — Z98.890 OTHER SPECIFIED POSTPROCEDURAL STATES: Chronic | ICD-10-CM

## 2020-03-02 PROCEDURE — 76942 ECHO GUIDE FOR BIOPSY: CPT | Mod: 26

## 2020-03-02 PROCEDURE — 50200 RENAL BIOPSY PERQ: CPT | Mod: LT

## 2020-03-02 PROCEDURE — 99152 MOD SED SAME PHYS/QHP 5/>YRS: CPT

## 2020-03-02 NOTE — PROGRESS NOTE ADULT - SUBJECTIVE AND OBJECTIVE BOX
INTERVENTIONAL RADIOLOGY BRIEF-OPERATIVE NOTE    Procedure: U/s guided renal core biopsy    Pre-Op Diagnosis: Renal dysfunction     Post-Op Diagnosis: same     Attending: Jairo  Resident: None    Anesthesia (type):  [ ] General Anesthesia  [x ] Sedation  [ ] Spinal Anesthesia  [ x] Local/Regional    Contrast: None    Estimated Blood Loss: Minimal, < 5 cc    Condition:   [ ] Critical  [ ] Serious  [ ] Fair   [ c] Good    Findings/Follow up Plan of Care:  see full report in pacs     Specimens Removed: 4 cores    Implants: none  Complications:  none     Disposition: Recovery      Please call Interventional Radiology [phone] with any questions, concerns, or issues.

## 2020-03-02 NOTE — PROGRESS NOTE ADULT - SUBJECTIVE AND OBJECTIVE BOX
PREOPERATIVE DAY OF PROCEDURE EVALUATION:     I have personally seen and examined this patient. I agree with the history and physical which I have reviewed and noted any changes below:     Plan is for renal biopsy with conscious sedation    Procedure/ risks/ benefits/ goals/ alternatives were explained. All questions answered. Informed content obtained from patient. Consent placed in chart.

## 2020-03-11 DIAGNOSIS — M32.14 GLOMERULAR DISEASE IN SYSTEMIC LUPUS ERYTHEMATOSUS: ICD-10-CM

## 2020-03-11 DIAGNOSIS — I10 ESSENTIAL (PRIMARY) HYPERTENSION: ICD-10-CM

## 2020-03-11 DIAGNOSIS — Z79.82 LONG TERM (CURRENT) USE OF ASPIRIN: ICD-10-CM

## 2020-03-11 DIAGNOSIS — N26.9 RENAL SCLEROSIS, UNSPECIFIED: ICD-10-CM

## 2021-11-23 NOTE — PATIENT PROFILE ADULT. - FUNCTIONAL SCREEN CURRENT LEVEL: COMMUNICATION, MLM
(0) understands/communicates without difficulty Cimzia Pregnancy And Lactation Text: This medication crosses the placenta but can be considered safe in certain situations. Cimzia may be excreted in breast milk.

## 2025-06-29 NOTE — H&P PST ADULT - NSICDXPASTMEDICALHX_GEN_ALL_CORE_FT
No
PAST MEDICAL HISTORY:  Hypertension     IUD (intrauterine device) in place     Lupus (systemic lupus erythematosus)     Lupus nephritis 2007 -    Sjogren's disease